# Patient Record
Sex: FEMALE | Race: BLACK OR AFRICAN AMERICAN | Employment: FULL TIME | URBAN - METROPOLITAN AREA
[De-identification: names, ages, dates, MRNs, and addresses within clinical notes are randomized per-mention and may not be internally consistent; named-entity substitution may affect disease eponyms.]

---

## 2021-12-13 NOTE — PROGRESS NOTES
Lakeisha Thibodeaux is a 64 y.o. female presenting today for New Patient (establish care) and Physical  .     Chief Complaint   Patient presents with    New Patient     establish care    Physical       HPI:  Lakeisha Thibodeaux presents to the office today to establish care. Patient used to see a PCP in [place] but has not had one since moving to [place] 5 yrs ago. HLD: Patient has a history of HLD with extensive family history of DM, HLD. She tried crestor, lipitor, and another statin but developed severe myalgias. She was switched to Zetia - with no side effects. She stopped taking it 2 yrs ago coz she felt like it wasn't doing anything. She has been exercising to stay healthy. 4 yrs ago she had intermittent chest pain while exercising - she underwent a stress test which was negative. She has not had a lipid panel checked in the past few years. Last LDL she remembers was 127. Patient states she has history of elevated LFTs. Screening for hepatitis was negative at that time. She had a liver biopsy which was negative 25 yrs ago. No cause was identified. Pt underwent a hysterectomy in 2017 after atypical cells were noted on the cervix. Healthcare Maintenance:  - Mammogram done on 12/09/21: BIRAD1/2. Screening again in 1 year  - Colonoscopy done a few years ago - polyp was removed. Was advised to repeat in 5 yrs. She has no active complaints at this time. ROS  ROS:  History obtained from the patient intake forms which are reviewed with the patient  · General: negative for - chills, fever, weight changes or malaise  · HEENT: no sore throat, nasal congestion, vision problems or ear problems  · Respiratory: no cough, shortness of breath, or wheezing  · Cardiovascular: no chest pain, palpitations, or dyspnea on exertion  · Gastrointestinal: no abdominal pain, N/V, change in bowel habits, or black or bloody stools  · Musculoskeletal: non specific intermittent back pain and knee pain.   · Neurological: no numbness, tingling, headache or dizziness  · Endo:  No polyuria or polydipsia. · : no hematuria, dysuria, frequency, hesitancy, or nocturia. · Psychological: negative for - anxiety, depression, sleep disturbances, suicidal or homicidal ideations      Allergies   Allergen Reactions    Crestor [Rosuvastatin] Myalgia    Lipitor [Atorvastatin] Myalgia       PHQ Screening   No flowsheet data found. History  History reviewed. No pertinent past medical history. Past Surgical History:   Procedure Laterality Date    HX HYSTERECTOMY      HX TUBAL LIGATION         Social History     Socioeconomic History    Marital status:      Spouse name: Not on file    Number of children: Not on file    Years of education: Not on file    Highest education level: Not on file   Occupational History    Not on file   Tobacco Use    Smoking status: Never Smoker    Smokeless tobacco: Never Used   Substance and Sexual Activity    Alcohol use: Yes    Drug use: Never    Sexual activity: Not Currently   Other Topics Concern    Not on file   Social History Narrative    Not on file     Social Determinants of Health     Financial Resource Strain:     Difficulty of Paying Living Expenses: Not on file   Food Insecurity:     Worried About Running Out of Food in the Last Year: Not on file    Joseph of Food in the Last Year: Not on file   Transportation Needs:     Lack of Transportation (Medical): Not on file    Lack of Transportation (Non-Medical):  Not on file   Physical Activity:     Days of Exercise per Week: Not on file    Minutes of Exercise per Session: Not on file   Stress:     Feeling of Stress : Not on file   Social Connections:     Frequency of Communication with Friends and Family: Not on file    Frequency of Social Gatherings with Friends and Family: Not on file    Attends Quaker Services: Not on file    Active Member of Clubs or Organizations: Not on file    Attends Club or Organization Meetings: Not on file    Marital Status: Not on file   Intimate Partner Violence:     Fear of Current or Ex-Partner: Not on file    Emotionally Abused: Not on file    Physically Abused: Not on file    Sexually Abused: Not on file   Housing Stability:     Unable to Pay for Housing in the Last Year: Not on file    Number of [place] in the Last Year: Not on file    Unstable Housing in the Last Year: Not on file             Vitals:    12/14/21 0803   BP: 138/78   Pulse: 86   Resp: 16   Temp: 97 °F (36.1 °C)   TempSrc: Oral   SpO2: 96%   Weight: 164 lb (74.4 kg)   Height: 5' 7\" (1.702 m)   PainSc:   0 - No pain       Physical Exam  Vitals and nursing note reviewed. Constitutional:       General: She is not in acute distress. Appearance: Normal appearance. She is normal weight. She is not ill-appearing, toxic-appearing or diaphoretic. HENT:      Head: Normocephalic and atraumatic. Nose: Nose normal. No congestion or rhinorrhea. Mouth/Throat:      Mouth: Mucous membranes are moist.      Pharynx: Oropharynx is clear. No oropharyngeal exudate or posterior oropharyngeal erythema. Eyes:      General: No scleral icterus. Right eye: No discharge. Left eye: No discharge. Extraocular Movements: Extraocular movements intact. Conjunctiva/sclera: Conjunctivae normal.      Pupils: Pupils are equal, round, and reactive to light. Cardiovascular:      Rate and Rhythm: Normal rate and regular rhythm. Pulses: Normal pulses. Heart sounds: Normal heart sounds. No murmur heard. No gallop. Pulmonary:      Effort: Pulmonary effort is normal. No respiratory distress. Breath sounds: Normal breath sounds. No wheezing or rales. Abdominal:      General: Bowel sounds are normal. There is no distension. Palpations: Abdomen is soft. There is no mass. Tenderness: There is no abdominal tenderness. There is no guarding or rebound. Hernia: No hernia is present. Musculoskeletal:         General: No swelling or tenderness. Normal range of motion. Cervical back: Normal range of motion. Right lower leg: No edema. Left lower leg: No edema. Skin:     General: Skin is warm and dry. Coloration: Skin is not jaundiced or pale. Neurological:      General: No focal deficit present. Mental Status: She is alert and oriented to person, place, and time. Mental status is at baseline. Cranial Nerves: No cranial nerve deficit. Motor: No weakness. Gait: Gait normal.   Psychiatric:         Mood and Affect: Mood normal.         Behavior: Behavior normal.         Thought Content: Thought content normal.         Judgment: Judgment normal.         No visits with results within 3 Month(s) from this visit. Latest known visit with results is:   No results found for any previous visit. No results found for any visits on 12/14/21. Patient Care Team:  Patient Care Team:  Brandy Loera MD as PCP - General (Internal Medicine)  Brandy Loera MD as PCP - [address] [place] [address] Provider      Assessment / Plan:      ICD-10-CM ICD-9-CM    1. Encounter for medical examination to establish care  Z00.00 V70.9    2. Hyperlipidemia, unspecified hyperlipidemia type  E78.5 272.4    3. Family history of diabetes mellitus (DM)  Z83.3 V18.0    4. Transaminitis  R74.01 790.4      HLD: Currently not on any medication since past few years. Check lipid panel and TSH/T4. Will discuss what medication to start based on results. Also screen for diabetes. Elevated LFTs: Check CMP. Healthcare Maintenance:  - Pap smear: not indicated due to hysterectomy  - Colonoscopy: requested patient to bring records. - Mammogram: Birad1/2. Due in 1 year  - Received COVID vaccine  - Received flu shot a month ago and Shingrix Vaccine: Requested to bring records on next visit. I asked the patient if she  had any questions and answered her  questions.   The patient stated that she understands the treatment plan and agrees with the treatment plan

## 2021-12-14 ENCOUNTER — OFFICE VISIT (OUTPATIENT)
Dept: FAMILY MEDICINE CLINIC | Age: 61
End: 2021-12-14
Payer: COMMERCIAL

## 2021-12-14 VITALS
HEART RATE: 86 BPM | BODY MASS INDEX: 25.74 KG/M2 | SYSTOLIC BLOOD PRESSURE: 138 MMHG | RESPIRATION RATE: 16 BRPM | TEMPERATURE: 97 F | DIASTOLIC BLOOD PRESSURE: 78 MMHG | OXYGEN SATURATION: 96 % | WEIGHT: 164 LBS | HEIGHT: 67 IN

## 2021-12-14 DIAGNOSIS — R74.01 TRANSAMINITIS: ICD-10-CM

## 2021-12-14 DIAGNOSIS — Z83.3 FAMILY HISTORY OF DIABETES MELLITUS (DM): ICD-10-CM

## 2021-12-14 DIAGNOSIS — Z00.00 ENCOUNTER FOR MEDICAL EXAMINATION TO ESTABLISH CARE: ICD-10-CM

## 2021-12-14 DIAGNOSIS — E78.5 HYPERLIPIDEMIA, UNSPECIFIED HYPERLIPIDEMIA TYPE: ICD-10-CM

## 2021-12-14 DIAGNOSIS — Z00.00 ENCOUNTER FOR MEDICAL EXAMINATION TO ESTABLISH CARE: Primary | ICD-10-CM

## 2021-12-14 PROCEDURE — 99386 PREV VISIT NEW AGE 40-64: CPT | Performed by: STUDENT IN AN ORGANIZED HEALTH CARE EDUCATION/TRAINING PROGRAM

## 2021-12-14 NOTE — PROGRESS NOTES
Harpreet Clay presents today for   Chief Complaint   Patient presents with    New Patient     establish care    Physical       Is someone accompanying this pt? no    Is the patient using any DME equipment during OV? no  Depression Screening:  No flowsheet data found. Learning Assessment:  Learning Assessment 12/14/2021   PRIMARY LEARNER Patient   HIGHEST LEVEL OF EDUCATION - PRIMARY LEARNER  > 4 YEARS OF COLLEGE   BARRIERS PRIMARY LEARNER NONE   PRIMARY LANGUAGE ENGLISH   LEARNER PREFERENCE PRIMARY READING   ANSWERED BY patient   RELATIONSHIP SELF       Abuse Screening:  No flowsheet data found. Fall Risk  No flowsheet data found. Health Maintenance reviewed and discussed and ordered per Provider. Health Maintenance Due   Topic Date Due    Hepatitis C Screening  Never done    DTaP/Tdap/Td series (1 - Tdap) Never done    Cervical cancer screen  Never done    Lipid Screen  Never done    Colorectal Cancer Screening Combo  Never done    Shingrix Vaccine Age 50> (1 of 2) Never done    Flu Vaccine (1) 09/01/2021   . Coordination of Care:  1. Have you been to the ER, urgent care clinic since your last visit? Hospitalized since your last visit? no    2. Have you seen or consulted any other health care providers outside of the [address] [place] [address] since your last visit? Include any pap smears or colon screening.  no

## 2022-01-09 LAB — SARS-COV-2, NAA: POSITIVE

## 2022-02-28 ENCOUNTER — HOSPITAL ENCOUNTER (OUTPATIENT)
Dept: LAB | Age: 62
Discharge: HOME | End: 2022-02-28

## 2022-02-28 LAB — SENTARA SPECIMEN COL,SENBCF: NORMAL

## 2022-02-28 PROCEDURE — 99001 SPECIMEN HANDLING PT-LAB: CPT

## 2022-03-01 ENCOUNTER — OFFICE VISIT (OUTPATIENT)
Dept: FAMILY MEDICINE CLINIC | Age: 62
End: 2022-03-01
Payer: COMMERCIAL

## 2022-03-01 ENCOUNTER — HOSPITAL ENCOUNTER (OUTPATIENT)
Dept: GENERAL RADIOLOGY | Age: 62
Discharge: HOME | End: 2022-03-01
Payer: COMMERCIAL

## 2022-03-01 VITALS
OXYGEN SATURATION: 97 % | SYSTOLIC BLOOD PRESSURE: 128 MMHG | WEIGHT: 170 LBS | HEART RATE: 88 BPM | BODY MASS INDEX: 26.68 KG/M2 | RESPIRATION RATE: 16 BRPM | HEIGHT: 67 IN | TEMPERATURE: 98 F | DIASTOLIC BLOOD PRESSURE: 82 MMHG

## 2022-03-01 DIAGNOSIS — M54.50 CHRONIC MIDLINE LOW BACK PAIN WITHOUT SCIATICA: ICD-10-CM

## 2022-03-01 DIAGNOSIS — E78.5 HYPERLIPIDEMIA, UNSPECIFIED HYPERLIPIDEMIA TYPE: ICD-10-CM

## 2022-03-01 DIAGNOSIS — G89.29 HIP PAIN, CHRONIC, RIGHT: ICD-10-CM

## 2022-03-01 DIAGNOSIS — R74.01 TRANSAMINITIS: ICD-10-CM

## 2022-03-01 DIAGNOSIS — M25.551 HIP PAIN, CHRONIC, RIGHT: Primary | ICD-10-CM

## 2022-03-01 DIAGNOSIS — M25.551 HIP PAIN, CHRONIC, RIGHT: ICD-10-CM

## 2022-03-01 DIAGNOSIS — G89.29 CHRONIC MIDLINE LOW BACK PAIN WITHOUT SCIATICA: ICD-10-CM

## 2022-03-01 DIAGNOSIS — G89.29 HIP PAIN, CHRONIC, RIGHT: Primary | ICD-10-CM

## 2022-03-01 LAB
A-G RATIO,AGRAT: 1.7 RATIO (ref 1.1–2.6)
ABSOLUTE LYMPHOCYTE COUNT, 10803: 1.1 K/UL (ref 1–4.8)
ALBUMIN SERPL-MCNC: 4.3 G/DL (ref 3.5–5)
ALP SERPL-CCNC: 100 U/L (ref 40–120)
ALT SERPL-CCNC: 20 U/L (ref 5–40)
ANION GAP SERPL CALC-SCNC: 13 MMOL/L (ref 3–15)
AST SERPL W P-5'-P-CCNC: 33 U/L (ref 10–37)
AVG GLU, 10930: 98 MG/DL (ref 91–123)
BASOPHILS # BLD: 0 K/UL (ref 0–0.2)
BASOPHILS NFR BLD: 1 % (ref 0–2)
BILIRUB SERPL-MCNC: 0.3 MG/DL (ref 0.2–1.2)
BUN SERPL-MCNC: 19 MG/DL (ref 6–22)
CALCIUM SERPL-MCNC: 9.6 MG/DL (ref 8.4–10.5)
CHLORIDE SERPL-SCNC: 102 MMOL/L (ref 98–110)
CHOLEST SERPL-MCNC: 302 MG/DL (ref 110–200)
CO2 SERPL-SCNC: 25 MMOL/L (ref 20–32)
CREAT SERPL-MCNC: 0.8 MG/DL (ref 0.8–1.4)
EOSINOPHIL # BLD: 0.1 K/UL (ref 0–0.5)
EOSINOPHIL NFR BLD: 5 % (ref 0–6)
ERYTHROCYTE [DISTWIDTH] IN BLOOD BY AUTOMATED COUNT: 13.2 % (ref 10–15.5)
GFRAA, 66117: >60
GFRNA, 66118: >60
GLOBULIN,GLOB: 2.5 G/DL (ref 2–4)
GLUCOSE SERPL-MCNC: 75 MG/DL (ref 70–99)
GRANULOCYTES,GRANS: 43 % (ref 40–75)
HBA1C MFR BLD HPLC: 5.1 % (ref 4.8–5.6)
HCT VFR BLD AUTO: 44.5 % (ref 35.1–48)
HDLC SERPL-MCNC: 3.3 MG/DL (ref 0–5)
HDLC SERPL-MCNC: 92 MG/DL
HGB BLD-MCNC: 14 G/DL (ref 11.7–16)
LDL/HDL RATIO,LDHD: 2.1
LDLC SERPL CALC-MCNC: 196 MG/DL (ref 50–99)
LYMPHOCYTES, LYMLT: 41 % (ref 20–45)
MCH RBC QN AUTO: 28 PG (ref 26–34)
MCHC RBC AUTO-ENTMCNC: 32 G/DL (ref 31–36)
MCV RBC AUTO: 90 FL (ref 80–99)
MONOCYTES # BLD: 0.3 K/UL (ref 0.1–1)
MONOCYTES NFR BLD: 10 % (ref 3–12)
NEUTROPHILS # BLD AUTO: 1.2 K/UL (ref 1.8–7.7)
NON-HDL CHOLESTEROL, 011976: 210 MG/DL
PLATELET # BLD AUTO: 207 K/UL (ref 140–440)
PMV BLD AUTO: 11 FL (ref 9–13)
POTASSIUM SERPL-SCNC: 4.1 MMOL/L (ref 3.5–5.5)
PROT SERPL-MCNC: 6.8 G/DL (ref 6.2–8.1)
RBC # BLD AUTO: 4.93 M/UL (ref 3.8–5.2)
SODIUM SERPL-SCNC: 140 MMOL/L (ref 133–145)
T4 FREE SERPL-MCNC: 1.3 NG/DL (ref 0.9–1.8)
TRIGL SERPL-MCNC: 69 MG/DL (ref 40–149)
TSH SERPL DL<=0.005 MIU/L-ACNC: 1.44 MCU/ML (ref 0.27–4.2)
VLDLC SERPL CALC-MCNC: 14 MG/DL (ref 8–30)
WBC # BLD AUTO: 2.7 K/UL (ref 4–11)

## 2022-03-01 PROCEDURE — 73502 X-RAY EXAM HIP UNI 2-3 VIEWS: CPT

## 2022-03-01 PROCEDURE — 72100 X-RAY EXAM L-S SPINE 2/3 VWS: CPT

## 2022-03-01 PROCEDURE — 99213 OFFICE O/P EST LOW 20 MIN: CPT | Performed by: STUDENT IN AN ORGANIZED HEALTH CARE EDUCATION/TRAINING PROGRAM

## 2022-03-01 RX ORDER — CYCLOSPORINE 0.5 MG/ML
EMULSION OPHTHALMIC
COMMUNITY
Start: 2022-02-15

## 2022-03-01 NOTE — PROGRESS NOTES
Nigel Moon is a 64 y.o. female presenting today for Results (review of lab results )  . Chief Complaint   Patient presents with    Results     review of lab results        HPI:  Nigel Moon presents to the office today for follow up. Patient used to see a PCP in [place] but has not had one since moving to [place] 5 yrs ago. HLD: Patient has a history of HLD with extensive family history of DM, HLD. She tried crestor, lipitor, and another statin but developed severe myalgias. She was switched to Zetia - with no side effects. She stopped taking it 2 yrs ago coz she felt like it wasn't doing anything. She has been exercising to stay healthy. 4 yrs ago she had intermittent chest pain while exercising - she underwent a stress test which was negative. She has not had a lipid panel checked in the past few years. Last LDL she remembers was 127. Patient states she has history of elevated LFTs. Screening for hepatitis was negative at that time. She had a liver biopsy which was negative 25 yrs ago. No cause was identified. Pt underwent a hysterectomy in 2017 after atypical cells were noted on the cervix. Today, Patient is complaining of lower back pain and Rt hip pain. She used to go to a chiropracter in the past and was told she had OA. She has been taking ibuprofen intermittently which relieves the pain. She was prescribed mobic in the past bust stopped taking it due to leg swelling. Healthcare Maintenance:  - Mammogram done on 12/09/21: BIRAD1/2. Screening again in 1 year  - Colonoscopy done a few years ago - polyp was removed. Was advised to repeat in 5 yrs.          ROS  ROS:  History obtained from the patient intake forms which are reviewed with the patient  · General: negative for - chills, fever, weight changes or malaise  · HEENT: no sore throat, nasal congestion, vision problems or ear problems  · Respiratory: no cough, shortness of breath, or wheezing  · Cardiovascular: no chest pain, palpitations, or dyspnea on exertion  · Gastrointestinal: no abdominal pain, N/V, change in bowel habits, or black or bloody stools  · Musculoskeletal: non specific intermittent back pain and knee pain. · Neurological: no numbness, tingling, headache or dizziness  · Endo:  No polyuria or polydipsia. · : no hematuria, dysuria, frequency, hesitancy, or nocturia. · Psychological: negative for - anxiety, depression, sleep disturbances, suicidal or homicidal ideations      Allergies   Allergen Reactions    Crestor [Rosuvastatin] Myalgia    Lipitor [Atorvastatin] Myalgia       PHQ Screening   3 most recent PHQ Screens 3/1/2022   Little interest or pleasure in doing things Not at all   Feeling down, depressed, irritable, or hopeless Not at all   Total Score PHQ 2 0       History  Past Medical History:   Diagnosis Date    Elevated LFTs     HLD (hyperlipidemia)        Past Surgical History:   Procedure Laterality Date    HX HYSTERECTOMY      HX TUBAL LIGATION         Social History     Socioeconomic History    Marital status:      Spouse name: Not on file    Number of children: Not on file    Years of education: Not on file    Highest education level: Not on file   Occupational History    Not on file   Tobacco Use    Smoking status: Never Smoker    Smokeless tobacco: Never Used   Vaping Use    Vaping Use: Never used   Substance and Sexual Activity    Alcohol use: Yes    Drug use: Never    Sexual activity: Not Currently   Other Topics Concern    Not on file   Social History Narrative    Not on file     Social Determinants of Health     Financial Resource Strain:     Difficulty of Paying Living Expenses: Not on file   Food Insecurity:     Worried About Running Out of Food in the Last Year: Not on file    Joseph of Food in the Last Year: Not on file   Transportation Needs:     Lack of Transportation (Medical): Not on file    Lack of Transportation (Non-Medical):  Not on file   Physical Activity:     Days of Exercise per Week: Not on file    Minutes of Exercise per Session: Not on file   Stress:     Feeling of Stress : Not on file   Social Connections:     Frequency of Communication with Friends and Family: Not on file    Frequency of Social Gatherings with Friends and Family: Not on file    Attends Latter day Services: Not on file    Active Member of Clubs or Organizations: Not on file    Attends Club or Organization Meetings: Not on file    Marital Status: Not on file   Intimate Partner Violence:     Fear of Current or Ex-Partner: Not on file    Emotionally Abused: Not on file    Physically Abused: Not on file    Sexually Abused: Not on file   Housing Stability:     Unable to Pay for Housing in the Last Year: Not on file    Number of [place] in the Last Year: Not on file    Unstable Housing in the Last Year: Not on file             Vitals:    03/01/22 0807   BP: 128/82   Pulse: 88   Resp: 16   Temp: 98 °F (36.7 °C)   TempSrc: Temporal   SpO2: 97%   Weight: 170 lb (77.1 kg)   Height: 5' 7\" (1.702 m)   PainSc:   0 - No pain       Physical Exam  Vitals and nursing note reviewed. Constitutional:       General: She is not in acute distress. Appearance: Normal appearance. She is normal weight. She is not ill-appearing, toxic-appearing or diaphoretic. HENT:      Head: Normocephalic and atraumatic. Nose: Nose normal. No congestion or rhinorrhea. Mouth/Throat:      Mouth: Mucous membranes are moist.      Pharynx: Oropharynx is clear. No oropharyngeal exudate or posterior oropharyngeal erythema. Eyes:      General: No scleral icterus. Right eye: No discharge. Left eye: No discharge. Extraocular Movements: Extraocular movements intact. Conjunctiva/sclera: Conjunctivae normal.      Pupils: Pupils are equal, round, and reactive to light. Cardiovascular:      Rate and Rhythm: Normal rate and regular rhythm. Pulses: Normal pulses. Heart sounds: Normal heart sounds. No murmur heard. No gallop. Pulmonary:      Effort: Pulmonary effort is normal. No respiratory distress. Breath sounds: Normal breath sounds. No wheezing or rales. Abdominal:      General: Bowel sounds are normal. There is no distension. Palpations: Abdomen is soft. There is no mass. Tenderness: There is no abdominal tenderness. There is no guarding or rebound. Hernia: No hernia is present. Musculoskeletal:         General: No swelling or tenderness. Normal range of motion. Cervical back: Normal range of motion. Right lower leg: No edema. Left lower leg: No edema. Skin:     General: Skin is warm and dry. Coloration: Skin is not jaundiced or pale. Neurological:      General: No focal deficit present. Mental Status: She is alert and oriented to person, place, and time. Mental status is at baseline. Cranial Nerves: No cranial nerve deficit. Motor: No weakness. Gait: Gait normal.   Psychiatric:         Mood and Affect: Mood normal.         Behavior: Behavior normal.         Thought Content: Thought content normal.         Judgment: Judgment normal.         Hospital Outpatient Visit on 02/28/2022   Component Date Value Ref Range Status    [facility] 02/28/2022 Specimens collected/sent to [facility]    Final       Results for orders placed or performed during the hospital encounter of 03/01/22   XR HIP RT W OR WO PELV 2-3 VWS    Narrative    Lumbar spine and right hip radiographs    HISTORY: Right lower back and right hip pain. FINDINGS: Frontal and lateral imaging of the lumbar spine. Frontal pelvis and  frog-leg lateral images of the right hip. A total of 5 images. The lumbar spine demonstrates moderately severe degenerative disc changes at  L3-L4 with left lateral osteophyte and at L4-L5 with right lateral osteophyte.   There are also degenerative changes at L5-S1 with prominent rightward anterior  osteophyte. Vertebral body heights are generally maintained. There is some  straightening of the lumbar lordosis. SI joints grossly intact. There is no acute fracture of the right hip. No significant arthritic change. Femoral head normal contour. No hip dislocation. Minimal osteitis pubis. Impression    Moderately severe degenerative disc disease L3-L4, L4-L5 and L5-S1. No significant arthritic change of the right hip. Osteitis pubis. No acute fractures. XR SPINE LUMB 2 OR 3 V    Narrative    Lumbar spine and right hip radiographs    HISTORY: Right lower back and right hip pain. FINDINGS: Frontal and lateral imaging of the lumbar spine. Frontal pelvis and  frog-leg lateral images of the right hip. A total of 5 images. The lumbar spine demonstrates moderately severe degenerative disc changes at  L3-L4 with left lateral osteophyte and at L4-L5 with right lateral osteophyte. There are also degenerative changes at L5-S1 with prominent rightward anterior  osteophyte. Vertebral body heights are generally maintained. There is some  straightening of the lumbar lordosis. SI joints grossly intact. There is no acute fracture of the right hip. No significant arthritic change. Femoral head normal contour. No hip dislocation. Minimal osteitis pubis. Impression    Moderately severe degenerative disc disease L3-L4, L4-L5 and L5-S1. No significant arthritic change of the right hip. Osteitis pubis. No acute fractures. Patient Care Team:  Patient Care Team:  Sherley Weiss MD as PCP - General (Internal Medicine)  Sherley Weiss MD as PCP - [facility] Provider      Assessment / Plan:      ICD-10-CM ICD-9-CM    1. Hip pain, chronic, right  M25.551 719.45 XR HIP RT W OR WO PELV 2-3 VWS    G89.29 338.29    2. Chronic midline low back pain without sciatica  M54.50 724.2 XR SPINE LUMB 2 OR 3 V    G89.29 338.29    3.  Hyperlipidemia, unspecified hyperlipidemia type  E78.5 272.4    4. Transaminitis  R74.01 790.4         Patient got labs done yesterday - results are pending. HLD: Currently not on any medication since past few years. Labs pending. Will discuss what medication to start based on results. Also screen for diabetes. Elevated LFTs: Check CMP. Hip pain and back pain: Likely due to OA. Xrays ordered. Plan to discuss further management once I review the lab results. Healthcare Maintenance:  - Pap smear: not indicated due to hysterectomy  - Colonoscopy: Patient completed records release form. - Mammogram: Birad1/2. Due in 1 year - 12/22  - Received COVID vaccine          I asked the patient if she  had any questions and answered her  questions.   The patient stated that she understands the treatment plan and agrees with the treatment plan

## 2022-03-01 NOTE — PROGRESS NOTES
Chief Complaint   Patient presents with    Results     review of lab results      1. \"Have you been to the ER, urgent care clinic since your last visit? Hospitalized since your last visit? \" Yes [facility] for negative COVID 19     2. \"Have you seen or consulted any other health care providers outside of the [address] [place] [address] since your last visit? \" No     3. For patients aged 39-70: Has the patient had a colonoscopy / FIT/ Cologuard? Yes - Care Gap present. Rooming MA/LPN to request most recent results  [place], [place]     If the patient is female:    4. For patients aged 41-77: Has the patient had a mammogram within the past 2 years? Yes - Care Gap present. Rooming MA/LPN to request most recent results      5. For patients aged 21-65: Has the patient had a pap smear?  No history of hysterectomy     Health Maintenance Due   Topic Date Due    Hepatitis C Screening  Never done    Depression Screen  Never done    Lipid Screen  Never done    Colorectal Cancer Screening Combo  Never done    Shingrix Vaccine Age 50> (1 of 2) Never done     Medical record release sign for [facility]

## 2022-03-02 ENCOUNTER — TELEPHONE (OUTPATIENT)
Dept: FAMILY MEDICINE CLINIC | Age: 62
End: 2022-03-02

## 2022-03-02 DIAGNOSIS — E78.5 HYPERLIPIDEMIA, UNSPECIFIED HYPERLIPIDEMIA TYPE: ICD-10-CM

## 2022-03-02 DIAGNOSIS — G89.29 CHRONIC MIDLINE LOW BACK PAIN WITHOUT SCIATICA: Primary | ICD-10-CM

## 2022-03-02 DIAGNOSIS — M54.50 CHRONIC MIDLINE LOW BACK PAIN WITHOUT SCIATICA: Primary | ICD-10-CM

## 2022-03-03 RX ORDER — PRAVASTATIN SODIUM 10 MG/1
10 TABLET ORAL
Qty: 30 TABLET | Refills: 1 | Status: SHIPPED | OUTPATIENT
Start: 2022-03-03 | End: 2022-04-04

## 2022-03-03 NOTE — TELEPHONE ENCOUNTER
Discussed results with patient over the phone. She has elevated LDL: 196. Has tried Zocor, Lipitor and Crestor in the past - she had to stop due to myalgias. Will try low dose pravastatin and see if she can tolerate - discussed with patient in detail. She does have a family history of heart disease and CVA. Patient also wants to get heart testing due to her family history. Last stress test was 3 years ago and was normal.     Back pain: Xray lumbar showed DJD.  Refer to PT.

## 2022-03-15 ENCOUNTER — HOSPITAL ENCOUNTER (OUTPATIENT)
Dept: PHYSICAL THERAPY | Age: 62
Discharge: HOME | End: 2022-03-15
Attending: STUDENT IN AN ORGANIZED HEALTH CARE EDUCATION/TRAINING PROGRAM
Payer: COMMERCIAL

## 2022-03-15 PROCEDURE — 97161 PT EVAL LOW COMPLEX 20 MIN: CPT

## 2022-03-15 NOTE — PROGRESS NOTES
PT DAILY TREATMENT NOTE/LUMBAR EVAL     Patient Name: Addie Cushing  Date:3/15/2022  : 1960  [x]  Patient  Verified  Payor: [employer] / Plan: [employer] / Product Type: HMO /    In time:1040  Out time:1120  Total Treatment Time (min): 40  Visit #: 1 of 8   Treatment Area: Other low back pain [M54.59]  SUBJECTIVE  Pain Level (0-10 scale): 4  [x]constant []intermittent []improving []worsening [x]no change since onset  Worse running, bending, exertion, yard work , job demands - prolonged sitting and standing, ergonomics. Better standing/sitting changing position  Any medication changes, allergies to medications, adverse drug reactions, diagnosis change, or new procedure performed?: [x] No    [] Yes (see summary sheet for update)  Subjective functional status/changes:     PLOF: longstanding Hx of LBP with baseline pain levels, describes recurrent episodes of increase / decrease pain, but still able to tolerate ADLS and activities, no AD, working, drives, Yoga, active lifestyle  Limitations to PLOF: pain   Mechanism of Injury: longstanding involvement that progressively has gotten worse most recently went to [facility] in 2022 to establish care as she moved here from [facility]. Current symptoms/Complaints: 65 YO female diagnosed as above and with S/S consistent with above diagnosis presents to skilled outpatient PT [facility] LBP, right > left indicating right hip region as well.  Pain today is 4/10,   Previous Treatment/Compliance: [facility], x-rays, medication, injection approx 10 years ago,   PMHx/Surgical Hx: weight change up recently  Work Hx: full time, [employer], admin work  Living Situation:  Lives in a 2 story home, not alone, 4 [address] with rail  Pt Goals: ways to help reduce discomfort  Barriers: [x]pain []financial []time []transportation []other  Motivation: Good  Substance use: []Alcohol []Tobacco []other:   FABQ Score: []low []elevate  Cognition: A & O x 3    Other:    OBJECTIVE/EXAMINATION  Domestic Life: work, yoga, household and community activities, yard work , enjoys walking  Activity/Recreational Limitations: pain   Mobility: I  Self Care: I       40 min [x]Eval                  []Re-Eval              With   [] TE   [] TA   [] neuro   [] other: Patient Education: [x] Review HEP    [] Progressed/Changed HEP based on:   [] positioning   [] body mechanics   [] transfers   [] heat/ice application    [] other:      Other Objective/Functional Measures:      Physical Therapy Evaluation - Lumbar Spine ([facility])    SUBJECTIVE  Chief Complaint:as above    Mechanism of injury:as above    Symptoms:as above  Aggravated by:   [] Bending [] Sitting [] Standing [] Walking   [] Moving [] Cough [] Sneeze [] Valsalva   [] AM  [] PM  Lying:  [] sup   [] pro   [] sidelying   [] Other:     Eased by:    [] Bending [] Sitting [] Standing [] Walking   [] Moving [] AM  [] PM  Lying: [] sup  [] pro  [] sidelying   [] Other:      Past History/Treatments: as above    Diagnostic Tests: [] Lab work [x] X-rays    [] CT [] MRI     [] Other:  Results:    Functional Status  Prior level of function: as above  Present functional limitations: FOTO   What position do you sleep in?: varies    OBJECTIVE  Posture: mild FH, RS,   Lateral Shift: [] R    [] L     [] +  [x] -  Kyphosis: [] Increased [] Decreased   []  WNL  Lordosis:  [] Increased [] Decreased   [x] WNL  Pelvic symmetry: [] WNL    [] Other:  Stork test normal B     Gait:  [x] Normal     [] Abnormal:    Active Movements: [] N/A   [] Too acute   [] Other:  ROM  AROM % PROM Comments:pain, area   Forward flexion 40-60 28 cm     Extension 20-30 27 degrees     SB right 20-30      SB left 20-30      Rotation right 5-10 %     Rotation left 5-10 %       Repeated Movements   Effects on present pain: produces (OH), abolishes (A), increases (incr), decreases (decr), centralizes (C), peripheral (PH), no effect (NE)   Pre-Test Sx Flexion Repeated Flexion Extension Repeated Extension Repeated SBL Repeated SBR   Sitting          Standing          Lying      N/A N/A   Comments:  Side Glide:  Sustained passive positioning test:    Neuro Screen [] WNL  Myotome/Dermatome/Reflexes:  Comments:    Dural Mobility:  SLR Sitting: [] R    [] L    [] +    [] -  @ (degrees):           Supine: [] R    [] L    [] +    [] -  @ (degrees):   Slump Test: [] R    [] L    [] +    [] -  @ (degrees):   Prone Knee Bend: [] R    [] L    [] +    [] -     Palpation  [] Min  [x] Mod  [] Severe    Location:STC TTP right superior glut, right SIJ and right piriformis region  [] Min  [] Mod  [] Severe    Location:  [] Min  [] Mod  [] Severe    Location:    Strength   L(0-5) R (0-5) N/T   Hip Flexion (L1,2) 5 4 []   Knee Extension (L3,4) 5 5 []   Ankle Dorsiflexion (L4) 5 5 []   Great Toe Extension (L5)   []   Ankle Plantarflexion (S1)   []   Knee Flexion (S1,2)   []   Upper Abdominals   []   Lower Abdominals   []   Paraspinals   []   Back Rotators   []   Gluteus Volodymyr   []   abd/add 5/5 5/5 []     Special Tests  Lumbar:  Lumb. Compression: [] Pos  [] Neg               Lumbar Distraction:   [] Pos  [] Neg    Quadrant:  [] Pos  [] Neg   [] Flex  [] Ext    Sacroilliac:  Gaenslen's: [] R    [] L    [] +    [] -     Compression: [] +    [] -     Gapping:  [] +    [] -     Thigh Thrust: [] R    [] L    [] +    [] -     Leg Length: [] +    [] -   Position:    Crests:    ASIS:    PSIS:    Sacral Sulcus:    Mobility: Standing flex:     Sitting flex:     Supine to sit:     Prone knee bend:         Hip: Merla Waldwick:  [] R    [] L    [] +    [] -     Scour:  [] R    [] L    [] +    [] -     Piriformis: [x] R  >  [x] L    [x] +    [] -          Deficits: Aaron's: [] R    [] L    [] +    [] -     Calvin: [] R    [] L    [] +    [] -     Hamstrings 90/90:    Gastrocsoleus (to neutral): Right: Left:       Global Muscular Weakness:  Abdominals:  Quadratus Lumborum:  Paraspinals:   Other:    Other tests/comments:       Pain Level (0-10 scale) post treatment: 4    ASSESSMENT/Changes in Function: Patient demonstrates the potential to make gains with improved ROM, strength, endurance/activity tolerance, functional FOTO survey score   and all within a reasonable time frame so as to increase their functional independence with ADLs and activities for carryover to  Improved quality, tolerance to household activities and work demands, ability to enjoy walking dog with no increase pain and participate with Yoga. Patient requires skilled Physical Therapy so as to monitor their response to and modify their treatment plan accordingly. Patient appears to be an appropriate candidate for skilled outpatient Physical Therapy. Patient will continue to benefit from skilled PT services to modify and progress therapeutic interventions, address ROM deficits, address strength deficits, analyze and address soft tissue restrictions, analyze and cue movement patterns, analyze and modify body mechanics/ergonomics, assess and modify postural abnormalities and instruct in home and community integration to attain remaining goals.      [x]  See Plan of Care  []  See progress note/recertification  []  See Discharge Summary         Progress towards goals / Updated goals:       PLAN  [x]  Upgrade activities as tolerated     [x]  Continue plan of care  []  Update interventions per flow sheet       []  Discharge due to:_  []  Other:_      Tiara Seymour, PT 3/15/2022  10:41 AM

## 2022-03-15 NOTE — PROGRESS NOTES
In [address] [place] [address], [address] [place] [address], [address]  [phone] fax      Plan of Care/ Statement of Necessity for Physical Therapy Services    Patient name: Virginie Monsalve Start of Care: 3/15/2022   Referral source: Isaura Webster MD : 1960    Medical Diagnosis: Other low back pain [M54.59]  Payor: Andre Chester / Plan: [employer] HMO / Product Type: HMO /  Onset Date:longstanding,  2022    Treatment Diagnosis: lower back pain   Prior Hospitalization: see medical history Provider#: [id number]   Medications: Verified on Patient summary List    Comorbidities: weight change recently   Prior Level of Function: longstanding Hx of LBP with baseline pain levels, describes recurrent episodes of increase / decrease pain, but still able to tolerate ADLS and activities, no AD, working, drives, Yoga, active lifestyle       The Plan of Care and following information is based on the information from the initial evaluation. Assessment/ key information:  63 YO female diagnosed as above and with S/S consistent with above diagnosis presents to skilled outpatient PT [facility] LBP, right > left indicating right hip region as well. Pain today is 4/10, she has decrease trunk ROM, decrease core  Strength, + right piriformis tension. Additionally she has B knee pain. Patient demonstrates the potential to make gains with improved ROM, strength, endurance/activity tolerance, functional FOTO survey score   and all within a reasonable time frame so as to increase their functional independence with ADLs and activities for carryover to  Improved quality, tolerance to household activities and work demands, ability to enjoy walking dog with no increase pain and participate with Yoga. Patient requires skilled Physical Therapy so as to monitor their response to and modify their treatment plan accordingly.  Patient appears to be an appropriate candidate for skilled outpatient Physical Therapy.         Evaluation Complexity History MEDIUM  Complexity : 1-2 comorbidities / personal factors will impact the outcome/ POC ; Examination MEDIUM Complexity : 3 Standardized tests and measures addressing body structure, function, activity limitation and / or participation in recreation  ;Presentation LOW Complexity : Stable, uncomplicated  ;Clinical Decision Making MEDIUM Complexity : FOTO score of 26-74  Overall Complexity Rating: LOW   Problem List: pain affecting function, decrease ROM, decrease strength, decrease ADL/ functional abilitiies, decrease activity tolerance, decrease flexibility/ joint mobility and other FOTO 61   Treatment Plan may include any combination of the following: Therapeutic exercise, Therapeutic activities, Neuromuscular re-education, Physical agent/modality, Manual therapy, Patient education, Self Care training and Home safety training  Patient / Family readiness to learn indicated by: asking questions, trying to perform skills and interest  Persons(s) to be included in education: patient (P)  Barriers to Learning/Limitations: None  Patient Goal (s): ways to help reduce discomfort  Patient Self Reported Health Status: good  Rehabilitation Potential: good    Short Term Goals: To be accomplished in 4 treatments:   1 patient will have established and be I with HEP to aid with I and self management at discharge   EVAL issued HEP   CURRENT   2 patient will have pain 3/10 to aid with increase tolerance to participating in recreation-  yoga   EVAL 4 , limited a little   CURRENT  Long Term Goals:  To be accomplished in 8 treatments:   1 patient will have pain 1/10 to aid with increase tolerance to participating in recreation-  yoga   EVAL 4 , limited a little   CURRENT   2 patient will have FOTO 69 to show projected increased gains in function and tolerance to ADLS and activities at home and work   EVAL 61   CURRENT   3 patient will have tolerance to stoop and lift 20# 5X for carryover to lifting heavy items   EVAL limited a lot   CURRENT   4 patient will report overall 50% improvement to aid with increase tolerance to yoga and work demands   EVAL pain worse with bending and yard work   CURRENT    Frequency / Duration: Patient to be seen 2 times per week for 4 weeks. Patient/ Caregiver education and instruction: Diagnosis, prognosis, self care, activity modification and exercises   [x]  Plan of care has been reviewed with JESUS Zapata, PT 3/15/2022 11:06 AM  ________________________________________________________________________    I certify that the above Therapy Services are being furnished while the patient is under my care. I agree with the treatment plan and certify that this therapy is necessary.     [de-identified] Signature:____________Date:_________TIME:________     Elise Randall MD  ** Signature, Date and Time must be completed for valid certification **      Please sign and return to In [address] [place] [address] Square  [address] [place] [address], [address] [place] [address], [address] [facility] [address] [phone] fax

## 2022-03-18 PROBLEM — G89.29 CHRONIC MIDLINE LOW BACK PAIN WITHOUT SCIATICA: Status: ACTIVE | Noted: 2022-03-01

## 2022-03-18 PROBLEM — M54.50 CHRONIC MIDLINE LOW BACK PAIN WITHOUT SCIATICA: Status: ACTIVE | Noted: 2022-03-01

## 2022-03-19 PROBLEM — E78.5 HYPERLIPIDEMIA: Status: ACTIVE | Noted: 2022-03-01

## 2022-03-19 PROBLEM — R74.01 TRANSAMINITIS: Status: ACTIVE | Noted: 2022-03-01

## 2022-03-20 PROBLEM — M25.551 HIP PAIN, CHRONIC, RIGHT: Status: ACTIVE | Noted: 2022-03-01

## 2022-03-20 PROBLEM — G89.29 HIP PAIN, CHRONIC, RIGHT: Status: ACTIVE | Noted: 2022-03-01

## 2022-03-21 ENCOUNTER — HOSPITAL ENCOUNTER (OUTPATIENT)
Dept: PHYSICAL THERAPY | Age: 62
Discharge: HOME | End: 2022-03-21
Attending: STUDENT IN AN ORGANIZED HEALTH CARE EDUCATION/TRAINING PROGRAM
Payer: COMMERCIAL

## 2022-03-21 PROCEDURE — 97140 MANUAL THERAPY 1/> REGIONS: CPT

## 2022-03-21 PROCEDURE — 97112 NEUROMUSCULAR REEDUCATION: CPT

## 2022-03-21 PROCEDURE — 97110 THERAPEUTIC EXERCISES: CPT

## 2022-03-21 NOTE — PROGRESS NOTES
PT DAILY TREATMENT NOTE     Patient Name: Nigel Moon  Date:3/21/2022  : 1960  [x]  Patient  Verified  Payor: Chen Gonzalez / Plan: [employer] HMO / Product Type: HMO /    In time: 11:16A  Out time:12:11P  Total Treatment Time (min): 55  Visit #: 2 of 8    Treatment Area: Other low back pain [M54.59]    SUBJECTIVE  Pain Level (0-10 scale): 4  Any medication changes, allergies to medications, adverse drug reactions, diagnosis change, or new procedure performed?: [x] No    [] Yes (see summary sheet for update)  Subjective functional status/changes:   [] No changes reported  Pt states \"oh just the usual\" in regards to low back pain level experiencing upon arrival to today's session. States not able to complete full HEP yet, but when she has performed some of the exercises \"it felt good\".     OBJECTIVE    Modality rationale: decrease pain and increase tissue extensibility to improve the patients ability to perform ADL/IADLs with greater ease    Min Type Additional Details    [] Estim:  []Unatt       []IFC  []Premod                        []Other:  []w/ice   []w/heat  Position:  Location:    [] Estim: []Att    []TENS instruct  []NMES                    []Other:  []w/US   []w/ice   []w/heat  Position:  Location:    []  Traction: [] Cervical       []Lumbar                       [] Prone          []Supine                       []Intermittent   []Continuous Lbs:  [] before manual  [] after manual    []  Ultrasound: []Continuous   [] Pulsed                           []1MHz   []3MHz W/cm2:  Location:    []  Iontophoresis with dexamethasone         Location: [] Take home patch   [] In clinic   10 []  Ice     [x]  heat  []  Ice massage  []  Laser   []  Anodyne Position: prone w/ bolster under B ankle  Location: low back/B Hip     []  Laser with stim  []  Other:  Position:  Location:    []  Vasopneumatic Device    []  Right     []  Left  Pre-treatment girth:  Post-treatment girth:  Measured at (location):  Pressure: [] lo [] med [] hi   Temperature: [] lo [] med [] hi     24 min Therapeutic Exercise:  [x] See flow sheet :   Rationale: increase ROM and increase strength to improve the patients ability to perform ADLs with greater ease     11 min Neuromuscular Re-education:  [x]  See flow sheet :   Rationale: increase strength, improve coordination and increase proprioception  to improve the patients ability to complete recreational activities with greater ease     10 min Manual Therapy:  (prone): STM/TPr to right lumbar paraspinal, QL and glute med, manual stretching of hip IR/ER    The manual therapy interventions were performed at a separate and distinct time from the therapeutic activities interventions. Rationale: decrease pain, increase ROM, increase tissue extensibility and decrease trigger points to increase tolerance to therex and return to PLOF           With   [x] TE   [] TA   [x] neuro   [] other: Patient Education: [x] Review HEP    [] Progressed/Changed HEP based on:   [] positioning   [] body mechanics   [] transfers   [] heat/ice application    [] other:      Other Objective/Functional Measures: Introduced ex program per flow sheet      Pain Level (0-10 scale) post treatment: 2    ASSESSMENT/Changes in Function: Initiated session w/ MT techniques listed above followed by performance of ex program per POC - occaitional cuing required to optimize glute activation as pt demonstrates significant lumbar paraspinal compensations during performance. Improved performance noted upon removal of cuing. Pt with positive response to session reporting a reduction in pain levels. Will continue to progress activity program as able and tolerated.       Patient will continue to benefit from skilled PT services to modify and progress therapeutic interventions, address functional mobility deficits, address ROM deficits, address strength deficits, analyze and address soft tissue restrictions, analyze and cue movement patterns, analyze and modify body mechanics/ergonomics, assess and modify postural abnormalities and instruct in home and community integration to attain remaining goals. [x]  See Plan of Care  []  See progress note/recertification  []  See Discharge Summary         Progress towards goals / Updated goals:  Short Term Goals: To be accomplished in 4 treatments:               1 patient will have established and be I with HEP to aid with I and self management at discharge               EVAL issued HEP               CURRENT:Progressing: Initiated, however not ind at this time, 03/21/22               2 patient will have pain 3/10 to aid with increase tolerance to participating in recreation-  yoga               EVAL 4 , limited a little               CURRENT  Long Term Goals:  To be accomplished in 8 treatments:               1 patient will have pain 1/10 to aid with increase tolerance to participating in recreation-  yoga               EVAL 4 , limited a little               CURRENT               2 patient will have FOTO 69 to show projected increased gains in function and tolerance to ADLS and activities at home and work               EVAL 61               CURRENT               3 patient will have tolerance to stoop and lift 20# 5X for carryover to lifting heavy items               EVAL limited a lot               CURRENT               4 patient will report overall 50% improvement to aid with increase tolerance to yoga and work demands               EVAL pain worse with bending and yard work               CURRENT      PLAN  [x]  Upgrade activities as tolerated     [x]  Continue plan of care  [x]  Update interventions per flow sheet       []  Discharge due to:_  []  Other:_      Charlie Graves DPT 3/21/2022  11:25 AM    Future Appointments   Date Time Provider John Ravi   3/25/2022 10:30 AM Sara Olguin PTA [facility]   3/28/2022 11:15 AM Kathia Garcia PTA [facility]   4/1/2022 10:30 AM Kathia Garcia PTA [facility]   4/4/2022 10:30 AM Romeo Ormond, PTA [facility]   4/8/2022 10:30 AM Romeo Ormond, PTA [facility]   4/11/2022 10:30 AM Radhames Kathleen, PT [facility]   7/12/2022  8:00 AM Gunnar Lau MD [facility]

## 2022-03-25 ENCOUNTER — HOSPITAL ENCOUNTER (OUTPATIENT)
Dept: PHYSICAL THERAPY | Age: 62
Discharge: HOME | End: 2022-03-25
Attending: STUDENT IN AN ORGANIZED HEALTH CARE EDUCATION/TRAINING PROGRAM
Payer: COMMERCIAL

## 2022-03-25 PROCEDURE — 97530 THERAPEUTIC ACTIVITIES: CPT | Performed by: PHYSICAL THERAPIST

## 2022-03-25 PROCEDURE — 97112 NEUROMUSCULAR REEDUCATION: CPT | Performed by: PHYSICAL THERAPIST

## 2022-03-25 PROCEDURE — 97140 MANUAL THERAPY 1/> REGIONS: CPT | Performed by: PHYSICAL THERAPIST

## 2022-03-25 PROCEDURE — 97110 THERAPEUTIC EXERCISES: CPT | Performed by: PHYSICAL THERAPIST

## 2022-03-25 NOTE — PROGRESS NOTES
PT DAILY TREATMENT NOTE     Patient Name: Beti Damian  Date:3/25/2022  : 1960  [x]  Patient  Verified  Payor: Sally Whatley / Plan: [facility] [employer] [place] [employer] / Product Type: HMO /    In time:11:30A  Out time:1230  Total Treatment Time (min): 60min  Visit #: 3 of 8    Treatment Area: Other low back pain [M54.59]    SUBJECTIVE  Pain Level (0-10 scale): 4/10  Any medication changes, allergies to medications, adverse drug reactions, diagnosis change, or new procedure performed?: [x] No    [] Yes (see summary sheet for update)  Subjective functional status/changes:   [] No changes reported  Patient got her appointment times confused - but is able to now get up and down with a little more ease    OBJECTIVE    Modality rationale: decrease edema, decrease pain and increase tissue extensibility to improve the patients ability to increase tolerance to activity.     Min Type Additional Details    [] Estim:  []Unatt       []IFC  []Premod                        []Other:  []w/ice   []w/heat  Position:  Location:    [] Estim: []Att    []TENS instruct  []NMES                    []Other:  []w/US   []w/ice   []w/heat  Position:  Location:    []  Traction: [] Cervical       []Lumbar                       [] Prone          []Supine                       []Intermittent   []Continuous Lbs:  [] before manual  [] after manual    []  Ultrasound: []Continuous   [] Pulsed                           []1MHz   []3MHz W/cm2:  Location:    []  Iontophoresis with dexamethasone         Location: [] Take home patch   [] In clinic   15 []  Ice     [x]  heat  []  Ice massage  []  Laser   []  Anodyne Position: prone  Location: lumbar spine    []  Laser with stim  []  Other:  Position:  Location:    []  Vasopneumatic Device    []  Right     []  Left  Pre-treatment girth:  Post-treatment girth:  Measured at (location):  Pressure:       [] lo [] med [] hi   Temperature: [] lo [] med [] hi   [] Skin assessment post-treatment:  []intact []redness- no adverse reaction    []redness - adverse reaction:     15 min Therapeutic Exercise:  [x] See flow sheet :   Rationale: increase ROM and increase strength to improve the patients ability to increase A/ROM and decrease pain with movement. 10 min Therapeutic Activity:  [x]  See flow sheet :   Rationale: increase strength and improve coordination  to improve the patients ability to Tolerate basic ADLs and job-related tasks without pain. 10 min Neuromuscular Re-education:  [x]  See flow sheet :   Rationale: improve coordination, improve balance and increase proprioception  to improve the patients ability to perform activities with good form and proprioception with tactile and verbal cuing appropriately. 10 min Manual Therapy:  Trigger point release and DTM to Right-sided lumbar paraspinals   The manual therapy interventions were performed at a separate and distinct time from the therapeutic activities interventions. Rationale: decrease pain, increase ROM, increase tissue extensibility and decrease edema  to decrease Right lumbar paraspinal muscle turgor. With   [x] TE   [x] TA   [x] neuro   [] other: Patient Education: [x] Review HEP    [x] Progressed/Changed HEP based on:   [x] positioning   [] body mechanics   [] transfers   [] heat/ice application    [] other:      Other Objective/Functional Measures: About 5 degrees lumbar extension in prone     Pain Level (0-10 scale) post treatment: 0/10    ASSESSMENT/Changes in Function: Patient states she has not been doing her home exercise program but is in the contemplation phase of behavioral change.  Patient education on movement is medicine and motion is lotion    Patient will continue to benefit from skilled PT services to modify and progress therapeutic interventions, address functional mobility deficits, address ROM deficits, address strength deficits, analyze and address soft tissue restrictions, analyze and cue movement patterns, analyze and modify body mechanics/ergonomics, assess and modify postural abnormalities, address imbalance/dizziness and instruct in home and community integration to attain remaining goals.      [x]  See Plan of Care  []  See progress note/recertification  []  See Discharge Summary         Progress towards goals / Updated goals:  Short Term Goals: To be accomplished in 4 treatments:        3 patient will have established and be I with HEP to aid with I and self management at discharge               EVAL issued HEP               CURRENT:Progressing: not doing it yet, but thinking about it      2 patient will have pain 3/10 to aid with increase tolerance to participating in recreation-  yoga               EVAL 4 , limited a little               NAJUANO not doing it, no change  Long Term Goals: To be accomplished in 8 treatments:               1 patient will have pain 1/10 to aid with increase tolerance to participating in recreation-  yoga               EVAL 4 , limited a little               ABDQKND               2 patient will have FOTO 69 to show projected increased gains in function and tolerance to ADLS and activities at home and work               EVAL 61               BNQHFJT               3 patient will have tolerance to stoop and lift 20# 5X for carryover to lifting heavy items               EVAL limited a lot               HWRXYRZ               4 patient will report overall 50% improvement to aid with increase tolerance to yoga and work demands               EVAL pain worse with bending and yard work               VNFJNMO      PLAN  [x]  Upgrade activities as tolerated     [x]  Continue plan of care  []  Update interventions per flow sheet       []  Discharge due to:_  []  Other:_      Kim Gallegos, PT 3/25/2022  11:37 AM    Future Appointments   Date Time Provider John Ravi   3/28/2022 11:15 AM J Luis Biswas, DPT [facility]   4/1/2022  9:45 AM Lyudmila Winkler PT [facility]   4/4/2022 10:30 AM Selina Cline PTA [facility]   4/8/2022  9:45 AM Jhony Bernal, PT [facility]   4/11/2022 10:30 AM Jhony Bernal, PT [facility]   7/12/2022  8:00 AM Macarena Lau MD [facility]

## 2022-03-28 ENCOUNTER — HOSPITAL ENCOUNTER (OUTPATIENT)
Dept: PHYSICAL THERAPY | Age: 62
Discharge: HOME | End: 2022-03-28
Attending: STUDENT IN AN ORGANIZED HEALTH CARE EDUCATION/TRAINING PROGRAM
Payer: COMMERCIAL

## 2022-03-28 PROCEDURE — 97110 THERAPEUTIC EXERCISES: CPT

## 2022-03-28 PROCEDURE — 97112 NEUROMUSCULAR REEDUCATION: CPT

## 2022-03-28 PROCEDURE — 97140 MANUAL THERAPY 1/> REGIONS: CPT

## 2022-03-28 NOTE — PROGRESS NOTES
PT DAILY TREATMENT NOTE     Patient Name: Camila Ramirez  Date:3/28/2022  : 1960  [x]  Patient  Verified  Payor: Real Joann / Plan: [address] [place] [employer] / Product Type: HMO /    In time: 11:15A  Out time:12:10P  Total Treatment Time (min): 55  Visit #: 4 of 8    Treatment Area: Other low back pain [M54.59]    SUBJECTIVE  Pain Level (0-10 scale): 3  Any medication changes, allergies to medications, adverse drug reactions, diagnosis change, or new procedure performed?: [x] No    [] Yes (see summary sheet for update)  Subjective functional status/changes:   [] No changes reported  Pt reports experiencing \"usual pain, nothing out of the ordinary\" upon arrival to today's session. Reports decreased compliance with prescribed HEP.     OBJECTIVE    Modality rationale: decrease pain and increase tissue extensibility to improve the patients ability to perform ADL/IADLs with greater ease    Min Type Additional Details    [] Estim:  []Unatt       []IFC  []Premod                        []Other:  []w/ice   []w/heat  Position:  Location:    [] Estim: []Att    []TENS instruct  []NMES                    []Other:  []w/US   []w/ice   []w/heat  Position:  Location:    []  Traction: [] Cervical       []Lumbar                       [] Prone          []Supine                       []Intermittent   []Continuous Lbs:  [] before manual  [] after manual    []  Ultrasound: []Continuous   [] Pulsed                           []1MHz   []3MHz W/cm2:  Location:    []  Iontophoresis with dexamethasone         Location: [] Take home patch   [] In clinic   10 []  Ice     [x]  heat  []  Ice massage  []  Laser   []  Anodyne Position: prone w/ bolster under B ankle  Location: low back/B Hip     []  Laser with stim  []  Other:  Position:  Location:    []  Vasopneumatic Device    []  Right     []  Left  Pre-treatment girth:  Post-treatment girth:  Measured at (location):  Pressure:       [] lo [] med [] hi   Temperature: [] lo [] med [] hi     23 min Therapeutic Exercise:  [x] See flow sheet :   Rationale: increase ROM and increase strength to improve the patients ability to perform ADLs with greater ease     13 min Neuromuscular Re-education:  [x]  See flow sheet :   Rationale: increase strength, improve coordination and increase proprioception  to improve the patients ability to complete recreational activities with greater ease     9 min Manual Therapy:  (prone): STM/TPr to right lumbar paraspinal, QL and glute med, manual stretching of hip IR/ER    The manual therapy interventions were performed at a separate and distinct time from the therapeutic activities interventions. Rationale: decrease pain, increase ROM, increase tissue extensibility and decrease trigger points to increase tolerance to therex and return to PLOF           With   [x] TE   [] TA   [x] neuro   [] other: Patient Education: [x] Review HEP    [] Progressed/Changed HEP based on:   [] positioning   [] body mechanics   [] transfers   [] heat/ice application    [x] other: Ed/provided strategies to increase compliance with prescribed HEP - pt verbalizes knowledge and understanding of this      Other Objective/Functional Measures:   Progressed ex program per flow sheet       Pain Level (0-10 scale) post treatment: 0    ASSESSMENT/Changes in Function: Initiated session w/ MT techniques listed above followed by progression of ex program through added core stability ex in quadruped position - occaitional cuing required to optimize glute activation/maintain neutral spine w/ improved performance noted upon removal of cuing. Pt with positive response to session reporting a complete reduction in pain levels. Will continue to progress activity program as able and tolerated.       Patient will continue to benefit from skilled PT services to modify and progress therapeutic interventions, address functional mobility deficits, address ROM deficits, address strength deficits, analyze and address soft tissue restrictions, analyze and cue movement patterns, analyze and modify body mechanics/ergonomics, assess and modify postural abnormalities and instruct in home and community integration to attain remaining goals. [x]  See Plan of Care  []  See progress note/recertification  []  See Discharge Summary         Progress towards goals / Updated goals:  Short Term Goals: To be accomplished in 4 treatments:               1 patient will have established and be I with HEP to aid with I and self management at discharge               EVAL issued HEP               CURRENT:Progressing: Initiated, however not ind/fully compliant at this time, 03/28/22               2 patient will have pain 3/10 to aid with increase tolerance to participating in recreation-  yoga               EVAL 4 , limited a little               CURRENT  Long Term Goals:  To be accomplished in 8 treatments:               1 patient will have pain 1/10 to aid with increase tolerance to participating in recreation-  yoga               EVAL 4 , limited a little               CURRENT               2 patient will have FOTO 69 to show projected increased gains in function and tolerance to ADLS and activities at home and work               EVAL 61               CURRENT               3 patient will have tolerance to stoop and lift 20# 5X for carryover to lifting heavy items               EVAL limited a lot               CURRENT               4 patient will report overall 50% improvement to aid with increase tolerance to yoga and work demands               EVAL pain worse with bending and yard work               CURRENT      PLAN  [x]  Upgrade activities as tolerated     [x]  Continue plan of care  [x]  Update interventions per flow sheet       []  Discharge due to:_  []  Other:_      Nestor Christy DPT 3/28/2022  11:25 AM    Future Appointments   Date Time Provider John Ravi   4/1/2022  9:45 AM Addy Gonzales PT [facility]   4/4/2022 10:30 AM Mary Foss Parthaburn Grief [facility]   4/8/2022  9:45 AM Ana Al PT [facility]   4/11/2022 10:30 AM Ana Al PT [facility]   7/12/2022  8:00 AM Harper Lau MD [facility]

## 2022-04-01 ENCOUNTER — HOSPITAL ENCOUNTER (OUTPATIENT)
Dept: PHYSICAL THERAPY | Age: 62
Discharge: HOME | End: 2022-04-01
Attending: STUDENT IN AN ORGANIZED HEALTH CARE EDUCATION/TRAINING PROGRAM
Payer: COMMERCIAL

## 2022-04-01 PROCEDURE — 97530 THERAPEUTIC ACTIVITIES: CPT

## 2022-04-01 PROCEDURE — 97112 NEUROMUSCULAR REEDUCATION: CPT

## 2022-04-01 PROCEDURE — 97110 THERAPEUTIC EXERCISES: CPT

## 2022-04-01 NOTE — PROGRESS NOTES
PT DAILY TREATMENT NOTE     Patient Name: Anda Collet  [id number]  : 1960  [x]  Patient  Verified  Payor: August Gomez / Plan: [address] [employer] / Product Type: HMO /    In time:948  Out time:1045  Total Treatment Time (min): 57  Visit #: 5 of 8     Treatment Area: Other low back pain [M54.59]    SUBJECTIVE  Pain Level (0-10 scale): 3  Any medication changes, allergies to medications, adverse drug reactions, diagnosis change, or new procedure performed?: [x] No    [] Yes (see summary sheet for update)  Subjective functional status/changes:   [] No changes reported  \"I am a little sore today. \"    OBJECTIVE    Modality rationale: decrease pain and increase tissue extensibility to improve the patients ability to tolerate ADLs and activities   Min Type Additional Details    [] Estim:  []Unatt       []IFC  []Premod                        []Other:  []w/ice   []w/heat  Position:  Location:    [] Estim: []Att    []TENS instruct  []NMES                    []Other:  []w/US   []w/ice   []w/heat  Position:  Location:    []  Traction: [] Cervical       []Lumbar                       [] Prone          []Supine                       []Intermittent   []Continuous Lbs:  [] before manual  [] after manual    []  Ultrasound: []Continuous   [] Pulsed                           []1MHz   []3MHz W/cm2:  Location:    []  Iontophoresis with dexamethasone         Location: [] Take home patch   [] In clinic   10 []  Ice     [x]  Heat  post  []  Ice massage  []  Laser   []  Anodyne Position:prone with bolster under ankles  Location: B LS    []  Laser with stim  []  Other:  Position:  Location:    []  Vasopneumatic Device    []  Right     []  Left  Pre-treatment girth:  Post-treatment girth:  Measured at (location):  Pressure:       [] lo [] med [] hi   Temperature: [] lo [] med [] hi   [] Skin assessment post-treatment:  []intact []redness- no adverse reaction    []redness  adverse reaction:          23 min Therapeutic Exercise: [x] See flow sheet :   Rationale: increase ROM, increase strength and improve coordination to improve the patients ability to tolerate ADLs and activities    10 min Therapeutic Activity:  [x]  See flow sheet :   Rationale: increase ROM, increase strength and improve coordination  to improve the patients ability to tolerate ADLS and activities      14 min Neuromuscular Re-education:  [x]  See flow sheet :   Rationale: increase ROM, increase strength and improve coordination  to improve the patients ability to tolerate ADLS and activities           With   [x] TE   [x] TA   [x] neuro   [] other: Patient Education: [x] Review HEP    [x] Progressed/Changed HEP based on:   [] positioning   [] body mechanics   [] transfers   [] heat/ice application    [] other:      Other Objective/Functional Measures: VC exercises and tech. TM .24 distance  Added palloff press 10# 2X10  Added Leg press DL 60# and SL 50# 2X10  Added fire hydrant 10X B   Instructed in purple ball 3 way     Pain Level (0-10 scale) post treatment: <3    ASSESSMENT/Changes in Function: tolerated well. No increase pain with addition of exercises- showing increase tolerance to core strengthening exercises    Patient will continue to benefit from skilled PT services to modify and progress therapeutic interventions, address ROM deficits, address strength deficits, analyze and address soft tissue restrictions, analyze and cue movement patterns, analyze and modify body mechanics/ergonomics, assess and modify postural abnormalities and instruct in home and community integration to attain remaining goals.      [x]  See Plan of Care  []  See progress note/recertification  []  See Discharge Summary         Progress towards goals / Updated goals:  Short Term Goals: To be accomplished in 4 treatments:               5 patient will have established and be I with HEP to aid with I and self management at discharge               EVAL issued HEP               CURRENT:Progressing: Initiated, however not ind/fully compliant at this time, 4/1/22               2 patient will have pain 3/10 to aid with increase tolerance to participating in recreation-  yoga               EVAL 4 , limited a little               HTPOBNZ 3 4/1/22  Long Term Goals: To be accomplished in 8 treatments:               1 patient will have pain 1/10 to aid with increase tolerance to participating in recreation-  yoga               EVAL 4 , limited a little               CURRENT 3 4/1/22               2 patient will have FOTO 69 to show projected increased gains in function and tolerance to ADLS and activities at home and work               EVAL  ongoing [id number] 4/1/22               3 patient will have tolerance to stoop and lift 20# 5X for carryover to lifting heavy items               EVAL limited a lot               UQVISBR ongoing NA 4/1/22               4 patient will report overall 50% improvement to aid with increase tolerance to yoga and work demands               EVAL pain worse with bending and yard work               IHUTCMQ  ongoing NA today 4/1/22    PLAN  [x]  Upgrade activities as tolerated     [x]  Continue plan of care  []  Update interventions per flow sheet       []  Discharge due to:_  []  Other:_      Karen Horton, PT 4/1/2022  9:49 AM    Future Appointments   Date Time Provider John Ravi   4/4/2022 10:30 AM Kelly Osborne PTA [facility]   4/8/2022  9:45 AM Sania Fraser PT [facility]   4/11/2022 10:30 AM Sania Fraser PT [facility]   7/12/2022  8:00 AM Fanta Lau MD [facility]

## 2022-04-04 ENCOUNTER — HOSPITAL ENCOUNTER (OUTPATIENT)
Dept: PHYSICAL THERAPY | Age: 62
Discharge: HOME | End: 2022-04-04
Attending: STUDENT IN AN ORGANIZED HEALTH CARE EDUCATION/TRAINING PROGRAM
Payer: COMMERCIAL

## 2022-04-04 PROCEDURE — 97110 THERAPEUTIC EXERCISES: CPT

## 2022-04-04 PROCEDURE — 97530 THERAPEUTIC ACTIVITIES: CPT

## 2022-04-04 PROCEDURE — 97140 MANUAL THERAPY 1/> REGIONS: CPT

## 2022-04-04 PROCEDURE — 97112 NEUROMUSCULAR REEDUCATION: CPT

## 2022-04-04 NOTE — PROGRESS NOTES
PT DAILY TREATMENT NOTE     Patient Name: Carrington Matson  Date:2022  : 1960  [x]  Patient  Verified  Payor: Mildred Angel / Plan: Dilia Barba HMO / Product Type: HMO /    In time: 1030 am   Out time 1130 am   Total Treatment Time (min): 60   Visit #: 6 of 8     Treatment Area: Other low back pain [M54.59]    SUBJECTIVE  Pain Level (0-10 scale): 3/10   Any medication changes, allergies to medications, adverse drug reactions, diagnosis change, or new procedure performed?: [x] No    [] Yes (see summary sheet for update)  Subjective functional status/changes:   [] No changes reported  Patient reports that most of her pain is in her low back today. Patient denies any pain into her legs today. OBJECTIVE    Modality rationale: decrease pain and increase tissue extensibility to improve the patients ability to assist with performing ADL's and functional tasks without limitations.    Min Type Additional Details    [] Estim:  []Unatt       []IFC  []Premod                        []Other:  []w/ice   []w/heat  Position:  Location:    [] Estim: []Att    []TENS instruct  []NMES                    []Other:  []w/US   []w/ice   []w/heat  Position:  Location:    []  Traction: [] Cervical       []Lumbar                       [] Prone          []Supine                       []Intermittent   []Continuous Lbs:  [] before manual  [] after manual    []  Ultrasound: []Continuous   [] Pulsed                           []1MHz   []3MHz W/cm2:  Location:    []  Iontophoresis with dexamethasone         Location: [] Take home patch   [] In clinic   10 []  Ice     [x]  heat  []  Ice massage  []  Laser   []  Anodyne Position:prone  Location: L/S     []  Laser with stim  []  Other:  Position:  Location:    []  Vasopneumatic Device    []  Right     []  Left  Pre-treatment girth:  Post-treatment girth:  Measured at (location):  Pressure:       [] lo [] med [] hi   Temperature: [] lo [] med [] hi   [] Skin assessment post-treatment: []intact []redness- no adverse reaction  []redness  adverse reaction:     15 min Therapeutic Exercise:  [x] See flow sheet :   Rationale: increase ROM and increase strength to improve the patients overall muscle endurance and activity tolerance for ADL's and functional tasks. 8 min Therapeutic Activity:  [x]  See flow sheet :   Rationale: increase strength and improve coordination  to improve the patients ability to safely perform dynamic activities and to improve functional performance of ADL's. 15 min Neuromuscular Re-education:  []  See flow sheet :   Rationale: increase strength, improve coordination and improve balance  to improve the patients ability to perform activities with good form, stability and proprioception. 12 min Manual Therapy:  Shot gun mobs/MET; DTM and stretching (B) piriformis and glute med   The manual therapy interventions were performed at a separate and distinct time from the therapeutic activities interventions. Rationale: decrease pain, increase ROM, increase tissue extensibility, decrease trigger points and increase postural awareness to assist with reducing SI joint dysfunction. With   [] TE   [] TA   [] neuro   [] other: Patient Education: [x] Review HEP    [] Progressed/Changed HEP based on:   [] positioning   [] body mechanics   [] transfers   [] heat/ice application    [] other:      Other Objective/Functional Measures: Added OTB to hip 3 way; mini squats    Left posteriorly rotated SI joint    Pain Level (0-10 scale) post treatment: 1/10     ASSESSMENT/Changes in Function:   Patient is progressing well towards goals. Progressed exercises, as per flow sheet, to assist with increasing (B) LE and core strength. Patient tolerated today's new exercise well. Verbal cues were required for from with some of today's exercises. Patient responded well to today's session, as evident by, reduction in SI joint dysfunction and decreased low back pain.  Patient will continue to benefit from skilled PT services to modify and progress therapeutic interventions, address functional mobility deficits, address ROM deficits, address strength deficits, analyze and address soft tissue restrictions, analyze and cue movement patterns, analyze and modify body mechanics/ergonomics, assess and modify postural abnormalities and instruct in home and community integration to attain remaining goals.      []  See Plan of Care  []  See progress note/recertification  []  See Discharge Summary         Progress towards goals / Updated goals:  Short Term Goals: To be accomplished in 4 treatments:               0 patient will have established and be I with HEP to aid with I and self management at discharge               EVAL issued HEP               CURRENT:Progressing: Initiated, however not ind/fully compliant at this time, 4/1/22               2 patient will have pain 3/10 to aid with increase tolerance to participating in recreation- [employer]               EVAL 4 , limited a little               BXOJNDH 3 4/1/22  [facility]. be accomplished in 8 treatments:               1 patient will have pain 1/10 to aid with increase tolerance to participating in recreation- [employer]               EVAL 4 , limited a little               ZQWFGED 3 4/1/22               2 patient will have FOTO 69 to show projected increased gains in function and tolerance to ADLS and activities at home and work               EVAL 61               KDEZDPZ ongoing [id number] [facility] 4/1/22               3 patient will have tolerance to stoop and lift 20# 5X for carryover to lifting heavy items               EVAL limited a lot               JKVVFCE ongoing NA 4/1/22               4 patient will report overall 50% improvement to aid with increase tolerance to yoga and work demands               EVAL pain worse with bending and yard work               SPSBSIM  ongoing NA today 4/1/22     PLAN  [x]  Upgrade activities as tolerated     [x]  Continue plan of care  []  Update interventions per flow sheet       []  Discharge due to:_  []  Other:_      Klarissa Chang, PTA 4/4/2022  10:54 AM    Future Appointments   Date Time Provider John Ravi   4/8/2022  9:45 AM Brigida Marie, PT [facility] [address] [facility]   4/11/2022 10:30 AM Allredgalen Marie, PT [facility] [address] [facility]   7/12/2022  8:00 AM Chris Lau MD [facility]

## 2022-04-08 ENCOUNTER — HOSPITAL ENCOUNTER (OUTPATIENT)
Dept: PHYSICAL THERAPY | Age: 62
Discharge: HOME | End: 2022-04-08
Attending: STUDENT IN AN ORGANIZED HEALTH CARE EDUCATION/TRAINING PROGRAM
Payer: COMMERCIAL

## 2022-04-08 PROCEDURE — 97140 MANUAL THERAPY 1/> REGIONS: CPT

## 2022-04-08 PROCEDURE — 97112 NEUROMUSCULAR REEDUCATION: CPT

## 2022-04-08 PROCEDURE — 97110 THERAPEUTIC EXERCISES: CPT

## 2022-04-08 PROCEDURE — 97530 THERAPEUTIC ACTIVITIES: CPT

## 2022-04-08 NOTE — PROGRESS NOTES
PT DAILY TREATMENT NOTE     Patient Name: Emily Ferreira  Date:2022  : 1960  [x]  Patient  Verified  Payor: Trevin Dumont / Plan: [address] [employer] / Product Type: HMO /    In time:950  Out time:1045  Total Treatment Time (min): 55  Visit #: 7 of 8   Treatment Area: Other low back pain [M54.59]    SUBJECTIVE  Pain Level (0-10 scale): 3  Any medication changes, allergies to medications, adverse drug reactions, diagnosis change, or new procedure performed?: [x] No    [] Yes (see summary sheet for update)  Subjective functional status/changes:   [] No changes reported  Reports she is doing pretty good today.      OBJECTIVE    Modality rationale: decrease pain and increase tissue extensibility to improve the patients ability to tolerate ADLs and activities   Min Type Additional Details    [] Estim:  []Unatt       []IFC  []Premod                        []Other:  []w/ice   []w/heat  Position:  Location:    [] Estim: []Att    []TENS instruct  []NMES                    []Other:  []w/US   []w/ice   []w/heat  Position:  Location:    []  Traction: [] Cervical       []Lumbar                       [] Prone          []Supine                       []Intermittent   []Continuous Lbs:  [] before manual  [] after manual    []  Ultrasound: []Continuous   [] Pulsed                           []1MHz   []3MHz W/cm2:  Location:    []  Iontophoresis with dexamethasone         Location: [] Take home patch   [] In clinic   10 []  Ice     [x]  heat  post  []  Ice massage  []  Laser   []  Anodyne Position:prone  Location:B LS    []  Laser with stim  []  Other:  Position:  Location:    []  Vasopneumatic Device    []  Right     []  Left  Pre-treatment girth:  Post-treatment girth:  Measured at (location):  Pressure:       [] lo [] med [] hi   Temperature: [] lo [] med [] hi   [] Skin assessment post-treatment:  []intact []redness- no adverse reaction    []redness  adverse reaction:          19 min Therapeutic Exercise:  [x] See flow sheet :   Rationale: increase ROM, increase strength and improve coordination to improve the patients ability to tolerate ADLs and activities    8 min Therapeutic Activity:  [x]  See flow sheet :   Rationale: increase ROM, increase strength, improve coordination, improve balance and increase proprioception  to improve the patients ability to tolerate ADLS and activities     15 min Neuromuscular Re-education:  [x]  See flow sheet :   Rationale: increase ROM, increase strength and improve coordination  to improve the patients ability to tolerate ADLS and activities    8 min Mahnual Therapy:  Shot gun mobs , alignment check, B Piriformis TPR    The manual therapy interventions were performed at a separate and distinct time from the therapeutic activities interventions. Rationale: decrease pain, increase ROM, increase tissue extensibility and decrease trigger points to tolerate ADLs and activities           With   [] TE   [] TA   [] neuro   [] other: Patient Education: [x] Review HEP    [] Progressed/Changed HEP based on:   [] positioning   [] body mechanics   [] transfers   [] heat/ice application    [] other:      Other Objective/Functional Measures: VC exercises and technique  TM . 25 miles in 6 minutes at 2.5 mph   Increased 3 way hip orange band to 12X each  Increased mini squats to 15X       Pain Level (0-10 scale) post treatment: <3    ASSESSMENT/Changes in Function: tolerated well with VC for bird dog and paffoff press. Decrease STC and notes benefit of B piriformis release    Patient will continue to benefit from skilled PT services to modify and progress therapeutic interventions, address ROM deficits, address strength deficits, analyze and address soft tissue restrictions, analyze and cue movement patterns, analyze and modify body mechanics/ergonomics, assess and modify postural abnormalities and instruct in home and community integration to attain remaining goals.      [x]  See Plan of Care  []  See progress note/recertification  []  See Discharge Summary         Progress towards goals / Updated goals:  Short Term Goals: To be accomplished in 4 treatments:               9 patient will have established and be I with HEP to aid with I and self management at discharge               EVAL issued HEP               CURRENT:Progressing: Initiated, however not ind/fully compliant at this time, 4/1/22   Reports compliance 4/8/22               2 patient will have pain 3/10 to aid with increase tolerance to participating in recreation-  yoga               EVAL 4 , limited a little               CURRENT 3 4/8/22  Long Term Goals: To be accomplished in 8 treatments:               1 patient will have pain 1/10 to aid with increase tolerance to participating in recreation- [employer]               EVAL 4 , limited a little               CURRENT 3 4/8/22               2 patient will have FOTO 69 to show projected increased gains in function and tolerance to ADLS and activities at home and work               EVAL 61               CURRENT ongoing NA recheck next session 4/8/22               3 patient will have tolerance to stoop and lift 20# 5X for carryover to lifting heavy items               EVAL limited a lot               CURRENT ongoing NA 4/8/22               4 patient will report overall 50% improvement to aid with increase tolerance to yoga and work demands               EVAL pain worse with bending and yard work               OHVGDGN  ongoing NA today 4/8/22       PLAN  [x]  Upgrade activities as tolerated     [x]  Continue plan of care  []  Update interventions per flow sheet       []  Discharge due to:_  [x]  Other:_  Recheck next session for MD note and add stoop and lift  Courtney Holder, PT 4/8/2022  9:47 AM    Future Appointments   Date Time Provider John Ravi   4/11/2022 10:30 [facility]   7/12/2022  8:00 AM Vadim Lau MD [facility]

## 2022-04-11 ENCOUNTER — HOSPITAL ENCOUNTER (OUTPATIENT)
Dept: PHYSICAL THERAPY | Age: 62
Discharge: HOME | End: 2022-04-11
Attending: STUDENT IN AN ORGANIZED HEALTH CARE EDUCATION/TRAINING PROGRAM
Payer: COMMERCIAL

## 2022-04-11 PROCEDURE — 97530 THERAPEUTIC ACTIVITIES: CPT

## 2022-04-11 PROCEDURE — 97140 MANUAL THERAPY 1/> REGIONS: CPT

## 2022-04-11 PROCEDURE — 97112 NEUROMUSCULAR REEDUCATION: CPT

## 2022-04-11 PROCEDURE — 97110 THERAPEUTIC EXERCISES: CPT

## 2022-04-11 NOTE — PROGRESS NOTES
Physical Therapy Discharge Instructions      [facility] [address], [address] [place] [address], [address]  [phone] fax    Patient: Rosy   : 1960      Continue Home Exercise Program 1-2 times per day for 2 weeks, then decrease to 3-5 times per week      Continue with    [x] Ice  as needed PRN times per day     [x] Heat           Follow up with MD:     [] Upon completion of therapy     [x] As needed      Recommendations:     [x]   Return to activity with home program    []   Return to activity with the following modifications:       []Post Rehab Program    []Join Independent aquatic program     []Return to/join local gym        Additional Comments:    Jada Boswell, PT 2022 10:55 AM

## 2022-04-11 NOTE — PROGRESS NOTES
PT DISCHARGE DAILY NOTE AND [id number]    Patient name: Azael Dobbs Start of Care: 3/15/22   Referral source: Orin Kern MD : 1960   Medical/Treatment Diagnosis: Other low back pain [M54.59] Onset Date:longstanding, 2022     Prior Hospitalization: see medical history Provider#: [id number]   Medications: Verified on Patient Summary List    Comorbidities: weight change recently   Prior Level of Function: longstanding Hx of LBP with baseline pain levels, describes recurrent episodes of increase / decrease pain, but still able to tolerate ADLS and activities, no AD, working, drives, Yoga, active lifestyle   Visits from Start of Care: 8    Missed Visits: 1    Reporting Period : 3/15/22 to 22    Date:2022  : 1960  [x]  Patient  Verified  Payor: Yesenia Davalos / Plan: Ovidio Roberson HMO / Product Type: HMO /    In time:1033  Out time:1136  Total Treatment Time (min): 61  Visit #: 8 of 8    Medicare/[facility] Only   Total Timed Codes (min):  53 1:1 Treatment Time:  53       SUBJECTIVE  Pain Level (0-10 scale): 3  Any medication changes, allergies to medications, adverse drug reactions, diagnosis change, or new procedure performed?: [x] No    [] Yes (see summary sheet for update)  Subjective functional status/changes:   [] No changes reported  \"I think I am ready to go on my own. I am going to miss the pressure point release you do. I can't find a good way to do that on my own at home. \"      OBJECTIVE    Modality rationale: decrease pain and increase tissue extensibility to improve the patients ability to tolerate ADLs and activities   Min Type Additional Details    [] Estim:  []Unatt       []IFC  []Premod                        []Other:  []w/ice   []w/heat  Position:  Location:    [] Estim: []Att    []TENS instruct  []NMES                    []Other:  []w/US   []w/ice   []w/heat  Position:  Location:    []  Traction: [] Cervical       []Lumbar                       [] Prone          []Supine []Intermittent   []Continuous Lbs:  [] before manual  [] after manual    []  Ultrasound: []Continuous   [] Pulsed                           []1MHz   []3MHz W/cm2:  Location:    []  Iontophoresis with dexamethasone         Location: [] Take home patch   [] In clinic   10 []  Ice     [x]  Heat post   []  Ice massage  []  Laser   []  Anodyne Position; prone   Location:Gluts/ lower LS    []  Laser with stim  []  Other:  Position:  Location:    []  Vasopneumatic Device    []  Right     []  Left  Pre-treatment girth:  Post-treatment girth:  Measured at (location):  Pressure:       [] lo [] med [] hi   Temperature: [] lo [] med [] hi   [] Skin assessment post-treatment:  []intact []redness- no adverse reaction    []redness  adverse reaction:      15 min Therapeutic Exercise:  [x] See flow sheet :   Rationale: increase ROM, increase strength and improve coordination to improve the patients ability to tolerate ADLS and activities    15 min Therapeutic Activity:  [x]  See flow sheet :   Rationale: increase ROM, increase strength and improve coordination  to improve the patients ability to tolerate ADLS and activities     15 min Neuromuscular Re-education:  [x]  See flow sheet :   Rationale: increase ROM, increase strength and improve coordination  to improve the patients ability to tolerate ADLS and activities    8 min Manual Therapy:  B Piriformis release, in prone    The manual therapy interventions were performed at a separate and distinct time from the therapeutic activities interventions.   Rationale: decrease pain, increase ROM, increase tissue extensibility and decrease trigger points to tolerate ADLS and activities           With   [] TE   [] TA   [] neuro   [] other: Patient Education: [x] Review HEP    [] Progressed/Changed HEP based on:   [] positioning   [] body mechanics   [] transfers   [] heat/ice application    [] other:      Other Objective/Functional Measures: VC exercises and technique Pain Level (0-10 scale) post treatment: <3    Summary of Care:  Short Term Goals: To be accomplished in 4 treatments:               1 patient will have established and be I with HEP to aid with I and self management at discharge               EVAL issued HEP               UFYSYXQ:  Reports compliance 4/11/22               2 patient will have pain 3/10 to aid with increase tolerance to participating in recreation-  yoga               EVAL 4 , limited a little               RFNXZCH [address] be accomplished in 8 treatments:               1 patient will have pain 1/10 to aid with increase tolerance to participating in recreation-  yoga               EVAL 4 , limited a little               CURRENT 3 411/22               2 patient will have FOTO 69 to show projected increased gains in function and tolerance to ADLS and activities at home and work               EVAL [phone] 4/11/22               3 patient will have tolerance to stoop and lift 20# 5X for carryover to lifting heavy items               EVAL limited a lot               CURRENT met 4/11/22               4 patient will report overall 50% improvement to aid with increase tolerance to yoga and work demands               EVAL pain worse with bending and yard work               AHVDGWS: 50%  4/11/22   ASSESSMENT/Changes in Function: tolerated well. Discharge instructions. Patient ready to attempt self management . Residual left > right piriformis soft tissue congestion today. Notes benefit of piriformis TPR.      Thank you for this referral!      PLAN  [x]Discontinue therapy: [x]Patient has reached or is progressing toward set goals      []Patient is non-compliant or has abdicated      []Due to lack of appreciable progress towards set goals    Eli Mcgrath, PT 4/11/2022  10:52 AM

## 2022-04-28 ENCOUNTER — APPOINTMENT (OUTPATIENT)
Dept: PHYSICAL THERAPY | Age: 62
End: 2022-04-28
Attending: STUDENT IN AN ORGANIZED HEALTH CARE EDUCATION/TRAINING PROGRAM
Payer: COMMERCIAL

## 2022-05-09 DIAGNOSIS — E78.5 HYPERLIPIDEMIA, UNSPECIFIED HYPERLIPIDEMIA TYPE: ICD-10-CM

## 2022-05-09 RX ORDER — PRAVASTATIN SODIUM 10 MG/1
10 TABLET ORAL
Qty: 90 TABLET | Refills: 0 | Status: SHIPPED | OUTPATIENT
Start: 2022-05-09 | End: 2022-07-26 | Stop reason: SDUPTHER

## 2022-07-26 ENCOUNTER — OFFICE VISIT (OUTPATIENT)
Dept: FAMILY MEDICINE CLINIC | Age: 62
End: 2022-07-26
Payer: COMMERCIAL

## 2022-07-26 VITALS
TEMPERATURE: 97.6 F | RESPIRATION RATE: 18 BRPM | DIASTOLIC BLOOD PRESSURE: 81 MMHG | SYSTOLIC BLOOD PRESSURE: 134 MMHG | BODY MASS INDEX: 26.84 KG/M2 | OXYGEN SATURATION: 95 % | WEIGHT: 171 LBS | HEIGHT: 67 IN | HEART RATE: 81 BPM

## 2022-07-26 DIAGNOSIS — D70.9 NEUTROPENIA, UNSPECIFIED TYPE (HCC): ICD-10-CM

## 2022-07-26 DIAGNOSIS — E78.00 PURE HYPERCHOLESTEROLEMIA: Primary | ICD-10-CM

## 2022-07-26 DIAGNOSIS — E55.9 VITAMIN D DEFICIENCY: ICD-10-CM

## 2022-07-26 DIAGNOSIS — G89.29 CHRONIC MIDLINE LOW BACK PAIN WITHOUT SCIATICA: ICD-10-CM

## 2022-07-26 DIAGNOSIS — Z11.59 ENCOUNTER FOR HEPATITIS C SCREENING TEST FOR LOW RISK PATIENT: ICD-10-CM

## 2022-07-26 DIAGNOSIS — Z12.11 SCREENING FOR COLON CANCER: ICD-10-CM

## 2022-07-26 DIAGNOSIS — M54.50 CHRONIC MIDLINE LOW BACK PAIN WITHOUT SCIATICA: ICD-10-CM

## 2022-07-26 PROCEDURE — 99214 OFFICE O/P EST MOD 30 MIN: CPT | Performed by: STUDENT IN AN ORGANIZED HEALTH CARE EDUCATION/TRAINING PROGRAM

## 2022-07-26 RX ORDER — PRAVASTATIN SODIUM 10 MG/1
10 TABLET ORAL
Qty: 90 TABLET | Refills: 1 | Status: SHIPPED | OUTPATIENT
Start: 2022-07-26

## 2022-07-26 NOTE — PROGRESS NOTES
Sandra Dakin is a 64 y.o. female presenting today for Follow Up Chronic Condition  . Chief Complaint   Patient presents with    Follow Up Chronic Condition       HPI:  Sandra Dakin presents to the office today for follow up. HLD: Patient has a history of HLD with extensive family history of DM, HLD. She tried crestor, lipitor, and another statin but developed severe myalgias. She was switched to Zetia - with no side effects. She stopped taking it 2 yrs ago coz she felt like it wasn't doing anything. She has been exercising to stay healthy. 4 yrs ago she had intermittent chest pain while exercising - she underwent a stress test which was negative. Lipid panel in 2/22 showed , HDL 92, triglycerides 69. She was resumed on pravastatin. Patient has been tolerating it well with no myalgias. Patient states she has history of elevated LFTs. Screening for hepatitis was negative at that time. She had a liver biopsy which was negative 25 yrs ago. No cause was identified. Recent LFTs are normal.    Pt underwent a hysterectomy in 2017 after atypical cells were noted on the cervix. Chronic back pain: She used to go to a chiropracter in the past and was told she had OA. She has been taking ibuprofen intermittently which relieves the pain. Patient was referred to PT with improvement. X-ray showed moderately severe degenerative disc disease in lumbar spine. Today, patient reports intermittent blurred vision in her right eye peripherally. She had an eye exam that was normal.      Healthcare Maintenance:  - Mammogram done on 12/09/21: BIRAD1/2. Screening again in 1 year  - Colonoscopy done a few years ago - polyp was removed. Was advised to repeat in 5 yrs.          ROS  ROS:  History obtained from the patient intake forms which are reviewed with the patient  General: negative for - chills, fever, weight changes or malaise  HEENT: no sore throat, nasal congestion, vision problems or ear problems  Respiratory: no cough, shortness of breath, or wheezing  Cardiovascular: no chest pain, palpitations, or dyspnea on exertion  Gastrointestinal: no abdominal pain, N/V, change in bowel habits, or black or bloody stools  Musculoskeletal: non specific intermittent back pain and knee pain. Neurological: no numbness, tingling, headache or dizziness  Endo:  No polyuria or polydipsia. : no hematuria, dysuria, frequency, hesitancy, or nocturia.     Psychological: negative for - anxiety, depression, sleep disturbances, suicidal or homicidal ideations      Allergies   Allergen Reactions    Crestor [Rosuvastatin] Myalgia    Lipitor [Atorvastatin] Myalgia       PHQ Screening   3 most recent PHQ Screens 7/26/2022   Little interest or pleasure in doing things Not at all   Feeling down, depressed, irritable, or hopeless Not at all   Total Score PHQ 2 0       History  Past Medical History:   Diagnosis Date    Elevated LFTs     HLD (hyperlipidemia)        Past Surgical History:   Procedure Laterality Date    HX HYSTERECTOMY      HX TUBAL LIGATION         Social History     Socioeconomic History    Marital status:      Spouse name: Not on file    Number of children: Not on file    Years of education: Not on file    Highest education level: Not on file   Occupational History    Not on file   Tobacco Use    Smoking status: Never    Smokeless tobacco: Never   Vaping Use    Vaping Use: Never used   Substance and Sexual Activity    Alcohol use: Yes    Drug use: Never    Sexual activity: Not Currently   Other Topics Concern    Not on file   Social History Narrative    Not on file     Social Determinants of Health     Financial Resource Strain: Not on file   Food Insecurity: Not on file   Transportation Needs: Not on file   Physical Activity: Not on file   Stress: Not on file   Social Connections: Not on file   Intimate Partner Violence: Not on file   Housing Stability: Not on file             Vitals:    07/26/22 5138 BP: 134/81   Pulse: 81   Resp: 18   Temp: 97.6 °F (36.4 °C)   TempSrc: Temporal   SpO2: 95%   Weight: 171 lb (77.6 kg)   Height: 5' 7\" (1.702 m)   PainSc:   0 - No pain       Physical Exam  Vitals and nursing note reviewed. Constitutional:       General: She is not in acute distress. Appearance: Normal appearance. She is normal weight. She is not ill-appearing, toxic-appearing or diaphoretic. HENT:      Head: Normocephalic and atraumatic. Nose: Nose normal. No congestion or rhinorrhea. Mouth/Throat:      Mouth: Mucous membranes are moist.      Pharynx: Oropharynx is clear. No oropharyngeal exudate or posterior oropharyngeal erythema. Eyes:      General: No scleral icterus. Right eye: No discharge. Left eye: No discharge. Extraocular Movements: Extraocular movements intact. Conjunctiva/sclera: Conjunctivae normal.      Pupils: Pupils are equal, round, and reactive to light. Cardiovascular:      Rate and Rhythm: Normal rate and regular rhythm. Pulses: Normal pulses. Heart sounds: Normal heart sounds. No murmur heard. No gallop. Pulmonary:      Effort: Pulmonary effort is normal. No respiratory distress. Breath sounds: Normal breath sounds. No wheezing or rales. Abdominal:      General: Bowel sounds are normal. There is no distension. Palpations: Abdomen is soft. There is no mass. Tenderness: There is no abdominal tenderness. There is no guarding or rebound. Hernia: No hernia is present. Musculoskeletal:         General: No swelling or tenderness. Normal range of motion. Cervical back: Normal range of motion. Right lower leg: No edema. Left lower leg: No edema. Skin:     General: Skin is warm and dry. Coloration: Skin is not jaundiced or pale. Neurological:      General: No focal deficit present. Mental Status: She is alert and oriented to person, place, and time. Mental status is at baseline. Cranial Nerves: No cranial nerve deficit. Motor: No weakness. Gait: Gait normal.   Psychiatric:         Mood and Affect: Mood normal.         Behavior: Behavior normal.         Thought Content: Thought content normal.         Judgment: Judgment normal.       Abstract on 05/05/2022   Component Date Value Ref Range Status    SARS-CoV-2, DEANNE 01/09/2022 Positive   Final       No results found for any visits on 07/26/22. Patient Care Team:  Patient Care Team:  Kandis Pascual MD as PCP - General (Internal Medicine Physician)  Kandis Pascual MD as PCP - [facility][1] Provider      Assessment / Plan:      ICD-10-CM ICD-9-CM    1. Pure hypercholesterolemia  E78.00 272.0 pravastatin (PRAVACHOL) 10 mg tablet      LIPID PANEL      LIPID PANEL      DUPLEX CAROTID BILATERAL      2. Encounter for hepatitis C screening test for low risk patient  Z11.59 V73.89 HEPATITIS C AB      HEPATITIS C AB      3. Neutropenia, unspecified type ([facility] [address]  D70.9 288.00 CBC WITH AUTOMATED DIFF      CBC WITH AUTOMATED DIFF      4. Vitamin D deficiency  E55.9 268.9 VITAMIN D, 25 HYDROXY      VITAMIN D, 25 HYDROXY      5. Screening for colon cancer  Z12.11 V76.51 REFERRAL TO GASTROENTEROLOGY      6. Chronic midline low back pain without sciatica  M54.50 724.2     G89.29 338.29              HLD: Continue pravastatin. Check repeat lipid panel. Hip pain and back pain: Reports improvement with PT. Vitamin D deficiency: Check vitamin D level. Last CBC showed low WBC level. Recheck CBC. Refer to GI for colonoscopy. Healthcare Maintenance:  - Pap smear: not indicated due to hysterectomy  - Colonoscopy: Completed in 6/17. Repeat in 5 years. - Mammogram: Birad1/2. Due in 1 year - 12/22  - Received COVID vaccine      Follow-up and Dispositions    Return in about 6 months (around 1/26/2023) for Routine F/u. I asked the patient if she  had any questions and answered her  questions.   The patient stated that she understands the treatment plan and agrees with the treatment plan

## 2022-07-26 NOTE — PROGRESS NOTES
1. \"Have you been to the ER, urgent care clinic since your last visit? Hospitalized since your last visit? \" No    2. \"Have you seen or consulted any other health care providers outside of the [address] [place] [address] since your last visit? \" No     3. For patients aged 39-70: Has the patient had a colonoscopy / FIT/ Cologuard? Yes - no Care Gap present      If the patient is female:    4. For patients aged 41-77: Has the patient had a mammogram within the past 2 years? Yes - no Care Gap present      5. For patients aged 21-65: Has the patient had a pap smear?  No

## 2022-07-27 LAB
25(OH)D3 SERPL-MCNC: 41.9 NG/ML (ref 32–100)
ATYPICAL LYMPHOCYTE C MAN (DIFF), 1073: 4 %
BASOPHILS ABS-DIF,2030: 0.1 K/UL (ref 0–0.2)
BASOPHILS C MAN (DIFF), 1068: 3 % (ref 0–2)
CHOLEST SERPL-MCNC: 243 MG/DL (ref 110–200)
EOS ABS-DIF,2069: 0.2 K/UL (ref 0–0.5)
EOSINOPHILS C MAN (DIFF), 1067: 7 % (ref 0–6)
ERYTHROCYTE [DISTWIDTH] IN BLOOD BY AUTOMATED COUNT: 13.6 % (ref 10–15.5)
HCT VFR BLD AUTO: 41.7 % (ref 35.1–48)
HCV AB SER IA-ACNC: NORMAL
HDLC SERPL-MCNC: 3 MG/DL (ref 0–5)
HDLC SERPL-MCNC: 80 MG/DL
HGB BLD-MCNC: 12.9 G/DL (ref 11.7–16)
LDL/HDL RATIO,LDHD: 1.8
LDLC SERPL CALC-MCNC: 146 MG/DL (ref 50–99)
LYMPHOCYTES C MAN (DIFF), 1065: 49 % (ref 20–45)
LYMPHS ABS-DIF,2105: 1.4 K/UL (ref 1–4.8)
MCH RBC QN AUTO: 28 PG (ref 26–34)
MCHC RBC AUTO-ENTMCNC: 31 G/DL (ref 31–36)
MCV RBC AUTO: 90 FL (ref 80–99)
MONOCYTES ABS-DIF,2141: 0.2 K/UL (ref 0.1–1)
MONOCYTES C MAN (DIFF), 1066: 6 % (ref 3–12)
NEUTROPHILS ABS,2156: 0.8 K/UL (ref 1.8–7.7)
NEUTROPHILS C MAN (DIFF), 1064: 30 % (ref 40–75)
NON-HDL CHOLESTEROL, 011976: 163 MG/DL
NORMOCHROMIC CELLAVISION, 1078: ABNORMAL
NORMOCYTIC CELLAVISION, 1079: ABNORMAL
PLATELET # BLD AUTO: 218 K/UL (ref 140–440)
PMV BLD AUTO: 10.7 FL (ref 9–13)
POLYCHROMASIA,POLYCM: ABNORMAL
RBC # BLD AUTO: 4.65 M/UL (ref 3.8–5.2)
SMEAR EVAL, 1131: ABNORMAL
TRIGL SERPL-MCNC: 85 MG/DL (ref 40–149)
VLDLC SERPL CALC-MCNC: 17 MG/DL (ref 8–30)
WBC # BLD AUTO: 2.6 K/UL (ref 4–11)

## 2022-07-28 LAB — PATH REVIEW OF SMEAR, 12050: NORMAL

## 2022-07-29 ENCOUNTER — TELEPHONE (OUTPATIENT)
Dept: FAMILY MEDICINE CLINIC | Age: 62
End: 2022-07-29

## 2022-07-29 DIAGNOSIS — D70.9 NEUTROPENIA, UNSPECIFIED TYPE (HCC): Primary | ICD-10-CM

## 2022-07-29 NOTE — TELEPHONE ENCOUNTER
Discussed lab results with patient over the phone. CBC in 2/22 showed WBC 2.7 with ANC 1200. Repeat CBC shows WBC 2.6 with  along with atypical lymphocytes. Will refer to hematology for further evaluation.

## 2022-08-01 ENCOUNTER — HOSPITAL ENCOUNTER (OUTPATIENT)
Dept: VASCULAR SURGERY | Age: 62
Discharge: HOME | End: 2022-08-01
Attending: STUDENT IN AN ORGANIZED HEALTH CARE EDUCATION/TRAINING PROGRAM
Payer: COMMERCIAL

## 2022-08-01 DIAGNOSIS — E78.00 PURE HYPERCHOLESTEROLEMIA: ICD-10-CM

## 2022-08-01 LAB
LEFT CCA DIST DIAS: 37.6 CM/S
LEFT CCA DIST SYS: 107.1 CM/S
LEFT CCA MID DIAS: 39.25 CM/S
LEFT CCA MID SYS: 116.79 CM/S
LEFT CCA PROX DIAS: 31.8 CM/S
LEFT CCA PROX SYS: 90.1 CM/S
LEFT ICA DIST DIAS: 43.4 CM/S
LEFT ICA DIST SYS: 108 CM/S
LEFT ICA MID DIAS: 35.4 CM/S
LEFT ICA MID SYS: 88.7 CM/S
LEFT ICA PROX DIAS: 28.9 CM/S
LEFT ICA PROX SYS: 88.7 CM/S
LEFT ICA/CCA SYS: 1.01 NO UNITS
LEFT VERTEBRAL DIAS: 33.75 CM/S
LEFT VERTEBRAL SYS: 75.7 CM/S
RIGHT CCA DIST DIAS: 30 CM/S
RIGHT CCA DIST SYS: 93 CM/S
RIGHT CCA MID DIAS: 26.81 CM/S
RIGHT CCA MID SYS: 91.42 CM/S
RIGHT CCA PROX DIAS: 28.4 CM/S
RIGHT CCA PROX SYS: 112.4 CM/S
RIGHT ECA DIAS: 13.78 CM/S
RIGHT ECA SYS: 78.5 CM/S
RIGHT ICA DIST DIAS: 39.7 CM/S
RIGHT ICA DIST SYS: 99.2 CM/S
RIGHT ICA MID DIAS: 38.3 CM/S
RIGHT ICA MID SYS: 105.6 CM/S
RIGHT ICA PROX DIAS: 21.5 CM/S
RIGHT ICA PROX SYS: 83.6 CM/S
RIGHT ICA/CCA SYS: 1.1 NO UNITS
RIGHT VERTEBRAL DIAS: 17.54 CM/S
RIGHT VERTEBRAL SYS: 45.3 CM/S
VAS RIGHT SUBCLAVIAN PROX EDV: 0 CM/S
VAS RIGHT SUBCLAVIAN PROX PSV: 90.3 CM/S

## 2022-08-01 PROCEDURE — 93880 EXTRACRANIAL BILAT STUDY: CPT

## 2022-10-05 LAB — FECAL OCCULT BLOOD: NEGATIVE

## 2022-10-19 ENCOUNTER — OFFICE VISIT (OUTPATIENT)
Dept: ONCOLOGY | Age: 62
End: 2022-10-19
Payer: COMMERCIAL

## 2022-10-19 VITALS
OXYGEN SATURATION: 100 % | WEIGHT: 176 LBS | SYSTOLIC BLOOD PRESSURE: 136 MMHG | HEIGHT: 67 IN | DIASTOLIC BLOOD PRESSURE: 80 MMHG | HEART RATE: 72 BPM | BODY MASS INDEX: 27.62 KG/M2 | RESPIRATION RATE: 16 BRPM

## 2022-10-19 DIAGNOSIS — D70.9 NEUTROPENIA, UNSPECIFIED TYPE (HCC): Primary | ICD-10-CM

## 2022-10-19 PROCEDURE — 99204 OFFICE O/P NEW MOD 45 MIN: CPT | Performed by: INTERNAL MEDICINE

## 2022-10-19 NOTE — PROGRESS NOTES
Hematology/Oncology Consultation Note      Date: 10/19/2022    Name: Beti Damian  : 1960        Eliseo Angeles MD         Subjective:     Chief complaint: Leukopenia    History of Present Illness:   Ms. Jake Bill is a most pleasant 64y.o. year old female who was seen for consultation of Leukopenia. The patient has a past medical history significant of hyperlipidemia. 2022 CBC reported total WBC 2.6, ANC 0.8, hemoglobin 12.9, platelet 634,715. Lab reviews indicated neutropenia since at least 2022, total WBC 2.7, ANC 1.2. The patient reported on-off fatigue. The patient otherwise has no other complaints. Denied fever, chills, night sweat, unintentional weight loss, skin lumps or bumps, acute bleeding or bruising issues. Denied headache, acute vision change, dizziness, chest pain, worsen shortness of breath, palpitation, productive cough, nausea, vomiting, abdominal pain, altered bowel habits, dysuria, worsen bone pain or back pain, new focal numbness or weakness.          Past Medical History, Family History, and Social History:    Past Medical History:   Diagnosis Date    Elevated LFTs     HLD (hyperlipidemia)      Past Surgical History:   Procedure Laterality Date    HX HYSTERECTOMY      HX TUBAL LIGATION       Social History     Socioeconomic History    Marital status:      Spouse name: Not on file    Number of children: Not on file    Years of education: Not on file    Highest education level: Not on file   Occupational History    Not on file   Tobacco Use    Smoking status: Never    Smokeless tobacco: Never   Vaping Use    Vaping Use: Never used   Substance and Sexual Activity    Alcohol use: Yes    Drug use: Never    Sexual activity: Not Currently   Other Topics Concern    Not on file   Social History Narrative    Not on file     Social Determinants of Health     Financial Resource Strain: Not on file   Food Insecurity: Not on file   Transportation Needs: Not on file   Physical Activity: Not on file   Stress: Not on file   Social Connections: Not on file   Intimate Partner Violence: Not on file   Housing Stability: Not on file     Family History   Problem Relation Age of Onset    Hypertension Mother     Cancer Mother     Diabetes Mother     Cancer Father     Stroke Brother     Heart Attack Brother     Cancer Maternal Aunt      Current Outpatient Medications   Medication Sig Dispense Refill    pravastatin (PRAVACHOL) 10 mg tablet Take 1 Tablet by mouth nightly. 90 Tablet 1    Restasis MultiDose 0.05 % drop ophthalmic drops INSTILL 1 DROP IN BOTH EYES TWICE DAILY FOR 12 MONTHS         Review of Systems   Constitutional:  Negative for chills, diaphoresis, fever, malaise/fatigue and weight loss. Respiratory:  Negative for cough, hemoptysis, shortness of breath and wheezing. Cardiovascular:  Negative for chest pain, palpitations and leg swelling. Gastrointestinal:  Negative for abdominal pain, diarrhea, heartburn, nausea and vomiting. Genitourinary:  Negative for dysuria, frequency, hematuria and urgency. Musculoskeletal:  Negative for joint pain and myalgias. Skin:  Negative for itching and rash. Neurological:  Negative for dizziness, seizures, weakness and headaches. Psychiatric/Behavioral:  Negative for depression. The patient does not have insomnia. Objective:   Visit Vitals  /80   Pulse 72   Resp 16   Ht 5' 7\" (1.702 m)   Wt 79.8 kg (176 lb)   SpO2 100%   BMI 27.57 kg/m²       ECOG Performance Status (grade): 0  0 - able to carry on all pre-disease activity w/out restriction  1 - restricted but able to carry out light work  2 - ambulatory and can self- care but unable to carry out work  3 - bed or chair >50% of waking hours  4 - completely disable, total care, confined to bed or chair    Physical Exam  Constitutional:       Appearance: Normal appearance. HENT:      Head: Normocephalic and atraumatic.    Eyes:      Pupils: Pupils are equal, round, and reactive to light. Cardiovascular:      Rate and Rhythm: Normal rate and regular rhythm. Heart sounds: Normal heart sounds. Pulmonary:      Effort: Pulmonary effort is normal.      Breath sounds: Normal breath sounds. Abdominal:      General: Bowel sounds are normal.      Palpations: Abdomen is soft. Tenderness: There is no abdominal tenderness. There is no guarding. Musculoskeletal:         General: Normal range of motion. Cervical back: Neck supple. Right lower leg: No edema. Left lower leg: No edema. Skin:     General: Skin is warm. Neurological:      General: No focal deficit present. Mental Status: She is alert and oriented to person, place, and time. Mental status is at baseline. Diagnostics:      No results found for this or any previous visit (from the past 96 hour(s)). Imaging:  No results found for this or any previous visit. Results for orders placed during the hospital encounter of 03/01/22    XR SPINE LUMB 2 OR 3 V    Narrative  Lumbar spine and right hip radiographs    HISTORY: Right lower back and right hip pain. FINDINGS: Frontal and lateral imaging of the lumbar spine. Frontal pelvis and  frog-leg lateral images of the right hip. A total of 5 images. The lumbar spine demonstrates moderately severe degenerative disc changes at  L3-L4 with left lateral osteophyte and at L4-L5 with right lateral osteophyte. There are also degenerative changes at L5-S1 with prominent rightward anterior  osteophyte. Vertebral body heights are generally maintained. There is some  straightening of the lumbar lordosis. SI joints grossly intact. There is no acute fracture of the right hip. No significant arthritic change. Femoral head normal contour. No hip dislocation. Minimal osteitis pubis. Impression  Moderately severe degenerative disc disease L3-L4, L4-L5 and L5-S1. No significant arthritic change of the right hip. Osteitis pubis.     No acute fractures. No results found for this or any previous visit. Pathology          Assessment:                                        1. Neutropenia, unspecified type ([facility] [address]        Plan:                                        # Leukopenia/ Neutropenia   -- Past medical history significant of hyperlipidemia. -- 7/26/2022 CBC reported total WBC 2.6, ANC 0.8, hemoglobin 12.9, platelet 815,185.  -- Lab reviews indicated neutropenia since at least 2/28/2022, total WBC 2.7, ANC 1.2.  -- Today I have reviewed with the patient about above lab findings. The differential diagnosis of neutropenia is quite broad. A variety of infections or drugs can be associated with neutropenia. Nutritional causes of neutropenia include cachexia, debilitated state, vitamin B12 and folate deficiencies, and copper deficiency. More common, especially in asymptomatic individuals, are chronic benign neutropenia, familial neutropenia, and cyclic neutropenia. -- In this case, there is no evidence by history of an inherited neutropenia, no history of chronic or recurrent infection, and no medications that were likely to be contributing. Plan:  -- The initial workup will be included a repeat CBC with diff, ESR/ CRP, LDH, EDMUND, B12, folate, HIV, SPEP/PAT, smear review, and flow cytometry. -- The patient will be notified if any interventions is warranted. Further workup will be guided by results from aforementioned initial study. -- We will see the patient back in about 3 weeks. Always sooner if required.         Orders Placed This Encounter    METABOLIC PANEL, COMPREHENSIVE     Standing Status:   Future     Standing Expiration Date:   10/20/2023    VITAMIN B12 & FOLATE     Standing Status:   Future     Standing Expiration Date:   10/19/2023    METHYLMALONIC ACID     Standing Status:   Future     Standing Expiration Date:   10/19/2023    CBC WITH AUTOMATED DIFF     Standing Status:   Future     Standing Expiration Date:   10/20/2023    LD Standing Status:   Future     Standing Expiration Date:   10/19/2023    PERIPHERAL SMEAR     Standing Status:   Future     Standing Expiration Date:   10/19/2023    ANTINUCLEAR ANTIBODIES, IFA     Standing Status:   Future     Standing Expiration Date:   10/19/2023    HIV 1/2 AG/AB, 4TH GENERATION,W RFLX CONFIRM     Standing Status:   Future     Standing Expiration Date:   10/19/2023    GAMMOPATHY EVAL, SPEP/PAT, IG QT/FLC     Standing Status:   Future     Standing Expiration Date:   10/19/2023    FLOW CYTOMETRY, PERIPHERAL BLD     Standing Status:   Future     Standing Expiration Date:   10/19/2023    C REACTIVE PROTEIN, QT     Standing Status:   Future     Standing Expiration Date:   10/20/2023             Ms. Lisseth Richardson has a reminder for a \"due or due soon\" health maintenance. I have asked that she contact her primary care provider for follow-up on this health maintenance. All of patient's questions answered to their apparent satisfaction. They verbally show understanding and agreement with aforementioned plan. Alfredo Calero MD  10/19/2022          Above mentioned total time spent for this encounter with more than 50% of the time spent in face-to-face counseling, discussing on diagnosis and management plan going forward, and co-ordination of care. Parts of this document has been produced using Dragon dictation system. Unrecognized errors in transcription may be present. Please do not hesitate to reach out for any questions or clarifications.         CC: Darci Swan MD

## 2022-10-20 LAB
A-G RATIO,AGRAT: 1.8 RATIO (ref 1.1–2.6)
ABSOLUTE LYMPHOCYTE COUNT, 10803: 1.1 K/UL (ref 1–4.8)
ALBUMIN SERPL-MCNC: 4.6 G/DL (ref 3.5–5)
ALBUMIN, 001488: 53.5 % (ref 46.6–62.6)
ALBUMIN, 001488: 53.5 % (ref 46.6–62.6)
ALP SERPL-CCNC: 93 U/L (ref 40–120)
ALPHA-1-GLOBULIN, 001489: 3.6 % (ref 1.7–4.1)
ALPHA-1-GLOBULIN, 001489: 3.6 % (ref 1.7–4.1)
ALPHA-2-GLOBULIN, 001490: 11.1 % (ref 5.9–13.5)
ALPHA-2-GLOBULIN, 001490: 11.1 % (ref 5.9–13.5)
ALT SERPL-CCNC: 22 U/L (ref 5–40)
ANA SCREEN, 8017: NEGATIVE
ANION GAP SERPL CALC-SCNC: 12 MMOL/L (ref 3–15)
AST SERPL W P-5'-P-CCNC: 33 U/L (ref 10–37)
BASOPHILS # BLD: 0 K/UL (ref 0–0.2)
BASOPHILS NFR BLD: 1 % (ref 0–2)
BETA GLOBULIN, 001491: 15.3 % (ref 10.9–18.9)
BETA GLOBULIN, 001491: 15.3 % (ref 10.9–18.9)
BILIRUB SERPL-MCNC: 0.3 MG/DL (ref 0.2–1.2)
BUN SERPL-MCNC: 14 MG/DL (ref 6–22)
C-REACTIVE PROTEIN, QT, 006627: <0.5 MG/DL
CALCIUM SERPL-MCNC: 9.7 MG/DL (ref 8.4–10.5)
CHLORIDE SERPL-SCNC: 101 MMOL/L (ref 98–110)
CO2 SERPL-SCNC: 29 MMOL/L (ref 20–32)
CREAT SERPL-MCNC: 0.7 MG/DL (ref 0.8–1.4)
EOSINOPHIL # BLD: 0.2 K/UL (ref 0–0.5)
EOSINOPHIL NFR BLD: 6 % (ref 0–6)
ERYTHROCYTE [DISTWIDTH] IN BLOOD BY AUTOMATED COUNT: 13.6 % (ref 10–15.5)
FOLATE,FOL: >20 NG/ML
GAMMA GLOBULIN, 001492: 16.5 % (ref 11.6–24.4)
GAMMA GLOBULIN, 001492: 16.5 % (ref 11.6–24.4)
GLOBULIN,GLOB: 2.6 G/DL (ref 2–4)
GLOMERULAR FILTRATION RATE: >60 ML/MIN/1.73 SQ.M.
GLUCOSE SERPL-MCNC: 45 MG/DL (ref 70–99)
GRANULOCYTES,GRANS: 43 % (ref 40–75)
HCT VFR BLD AUTO: 41 % (ref 35.1–48)
HGB BLD-MCNC: 12.6 G/DL (ref 11.7–16)
HIV -1/0/2 AG/AB WITH REFLEX, 13463: NON REACTIVE
HIV 1 & 2 AB SER-IMP: NORMAL
IMMUNOFIXATION RESULT, SERUM, 001795: NORMAL
LDH SERPL L TO P-CCNC: 261 U/L (ref 98–192)
LYMPHOCYTES, LYMLT: 39 % (ref 20–45)
MCH RBC QN AUTO: 27 PG (ref 26–34)
MCHC RBC AUTO-ENTMCNC: 31 G/DL (ref 31–36)
MCV RBC AUTO: 89 FL (ref 80–99)
MONOCYTES # BLD: 0.3 K/UL (ref 0.1–1)
MONOCYTES NFR BLD: 11 % (ref 3–12)
NEUTROPHILS # BLD AUTO: 1.2 K/UL (ref 1.8–7.7)
PE INTERPRETATION, 107352: NORMAL
PLATELET # BLD AUTO: 199 K/UL (ref 140–440)
PMV BLD AUTO: 10.5 FL (ref 9–13)
POTASSIUM SERPL-SCNC: 4.2 MMOL/L (ref 3.5–5.5)
PROT SERPL-MCNC: 7 G/DL (ref 6.2–8.1)
PROT SERPL-MCNC: 7 G/DL (ref 6.2–8.1)
PROT SERPL-MCNC: 7.2 G/DL (ref 6.2–8.1)
RBC # BLD AUTO: 4.61 M/UL (ref 3.8–5.2)
SODIUM SERPL-SCNC: 142 MMOL/L (ref 133–145)
VIT B12 SERPL-MCNC: 865 PG/ML (ref 211–911)
WBC # BLD AUTO: 2.9 K/UL (ref 4–11)

## 2022-10-21 LAB — PATH REVIEW OF SMEAR, 12050: NORMAL

## 2022-10-24 LAB — METHYLMALONIC ACID: 150 NMOL/L (ref 0–378)

## 2022-11-10 ENCOUNTER — OFFICE VISIT (OUTPATIENT)
Dept: ONCOLOGY | Age: 62
End: 2022-11-10
Payer: COMMERCIAL

## 2022-11-10 VITALS
SYSTOLIC BLOOD PRESSURE: 135 MMHG | HEIGHT: 67 IN | TEMPERATURE: 97.9 F | DIASTOLIC BLOOD PRESSURE: 85 MMHG | WEIGHT: 178 LBS | HEART RATE: 97 BPM | BODY MASS INDEX: 27.94 KG/M2 | OXYGEN SATURATION: 98 %

## 2022-11-10 DIAGNOSIS — D72.819 LEUKOPENIA, UNSPECIFIED TYPE: ICD-10-CM

## 2022-11-10 DIAGNOSIS — D70.9 NEUTROPENIA, UNSPECIFIED TYPE (HCC): Primary | ICD-10-CM

## 2022-11-10 PROBLEM — R74.8 ELEVATED LIVER ENZYMES: Status: ACTIVE | Noted: 2022-11-10

## 2022-11-10 PROBLEM — M77.30 CALCANEAL SPUR: Status: ACTIVE | Noted: 2022-11-10

## 2022-11-10 PROCEDURE — 99214 OFFICE O/P EST MOD 30 MIN: CPT | Performed by: INTERNAL MEDICINE

## 2022-11-10 NOTE — PROGRESS NOTES
Hematology/Oncology Note      Date: 11/10/2022    Name: Margarita Key  : 1960        Darci Swan MD         Subjective:     Chief complaint: Leukopenia    History of Present Illness:   Ms. Lisseth Richardson is a most pleasant 58y.o. year old female who was seen for consultation of Leukopenia. The patient reported on-off fatigue. The patient otherwise has no other complaints. Denied fever, chills, night sweat, unintentional weight loss, skin lumps or bumps, acute bleeding or bruising issues. Denied headache, acute vision change, dizziness, chest pain, worsen shortness of breath, palpitation, productive cough, nausea, vomiting, abdominal pain, altered bowel habits, dysuria, worsen bone pain or back pain, new focal numbness or weakness.          Past Medical History, Family History, and Social History:    Past Medical History:   Diagnosis Date    Elevated LFTs     HLD (hyperlipidemia)      Past Surgical History:   Procedure Laterality Date    HX HYSTERECTOMY      HX TUBAL LIGATION       Social History     Socioeconomic History    Marital status:      Spouse name: Not on file    Number of children: Not on file    Years of education: Not on file    Highest education level: Not on file   Occupational History    Not on file   Tobacco Use    Smoking status: Never    Smokeless tobacco: Never   Vaping Use    Vaping Use: Never used   Substance and Sexual Activity    Alcohol use: Yes    Drug use: Never    Sexual activity: Not Currently   Other Topics Concern    Not on file   Social History Narrative    Not on file     Social Determinants of Health     Financial Resource Strain: Not on file   Food Insecurity: Not on file   Transportation Needs: Not on file   Physical Activity: Not on file   Stress: Not on file   Social Connections: Not on file   Intimate Partner Violence: Not on file   Housing Stability: Not on file     Family History   Problem Relation Age of Onset    Hypertension Mother     Cancer Mother Diabetes Mother     Cancer Father     Stroke Brother     Heart Attack Brother     Leukemia Maternal Aunt      Current Outpatient Medications   Medication Sig Dispense Refill    pravastatin (PRAVACHOL) 10 mg tablet Take 1 Tablet by mouth nightly. 90 Tablet 1    Restasis MultiDose 0.05 % drop ophthalmic drops INSTILL 1 DROP IN BOTH EYES TWICE DAILY FOR 12 MONTHS         Review of Systems   Constitutional:  Negative for chills, diaphoresis, fever, malaise/fatigue and weight loss. Respiratory:  Negative for cough, hemoptysis, shortness of breath and wheezing. Cardiovascular:  Negative for chest pain, palpitations and leg swelling. Gastrointestinal:  Negative for abdominal pain, diarrhea, heartburn, nausea and vomiting. Genitourinary:  Negative for dysuria, frequency, hematuria and urgency. Musculoskeletal:  Negative for joint pain and myalgias. Skin:  Negative for itching and rash. Neurological:  Negative for dizziness, seizures, weakness and headaches. Psychiatric/Behavioral:  Negative for depression. The patient does not have insomnia. Objective:   Visit Vitals  /85   Pulse 97   Temp 97.9 °F (36.6 °C)   Ht 5' 7\" (1.702 m)   Wt 80.7 kg (178 lb)   SpO2 98%   BMI 27.88 kg/m²         ECOG Performance Status (grade): 0  0 - able to carry on all pre-disease activity w/out restriction  1 - restricted but able to carry out light work  2 - ambulatory and can self- care but unable to carry out work  3 - bed or chair >50% of waking hours  4 - completely disable, total care, confined to bed or chair    Physical Exam  Constitutional:       Appearance: Normal appearance. HENT:      Head: Normocephalic and atraumatic. Eyes:      Pupils: Pupils are equal, round, and reactive to light. Cardiovascular:      Rate and Rhythm: Normal rate and regular rhythm. Heart sounds: Normal heart sounds. Pulmonary:      Effort: Pulmonary effort is normal.      Breath sounds: Normal breath sounds. Abdominal:      General: Bowel sounds are normal.      Palpations: Abdomen is soft. Tenderness: There is no abdominal tenderness. There is no guarding. Musculoskeletal:         General: Normal range of motion. Cervical back: Neck supple. Right lower leg: No edema. Left lower leg: No edema. Skin:     General: Skin is warm. Neurological:      General: No focal deficit present. Mental Status: She is alert and oriented to person, place, and time. Mental status is at baseline. Diagnostics:      No results found for this or any previous visit (from the past 96 hour(s)). Imaging:  No results found for this or any previous visit. Results for orders placed during the hospital encounter of 03/01/22    XR SPINE LUMB 2 OR 3 V    Narrative  Lumbar spine and right hip radiographs    HISTORY: Right lower back and right hip pain. FINDINGS: Frontal and lateral imaging of the lumbar spine. Frontal pelvis and  frog-leg lateral images of the right hip. A total of 5 images. The lumbar spine demonstrates moderately severe degenerative disc changes at  L3-L4 with left lateral osteophyte and at L4-L5 with right lateral osteophyte. There are also degenerative changes at L5-S1 with prominent rightward anterior  osteophyte. Vertebral body heights are generally maintained. There is some  straightening of the lumbar lordosis. SI joints grossly intact. There is no acute fracture of the right hip. No significant arthritic change. Femoral head normal contour. No hip dislocation. Minimal osteitis pubis. Impression  Moderately severe degenerative disc disease L3-L4, L4-L5 and L5-S1. No significant arthritic change of the right hip. Osteitis pubis. No acute fractures. No results found for this or any previous visit.         Pathology          Assessment:                                        1. Neutropenia, unspecified type (HCC)    2. Leukopenia, unspecified type Plan:                                        # Leukopenia/ Neutropenia   -- Past medical history significant of hyperlipidemia. -- 7/26/2022 CBC reported total WBC 2.6, ANC 0.8, hemoglobin 12.9, platelet 938,402.  -- Lab reviews indicated neutropenia since at least 2/28/2022, total WBC 2.7, ANC 1.2.  -- Today I have reviewed with the patient about recent lab reports. 10/19/2022 CBC reported total WBC was improving 2.9, ANC 1.2, hemoglobin 12.6, hematocrit 41%, platelet 157,358. Vitamin B12 865, folate > 20, . Negative EDMUND/HIV serology. SPEP reported within normal limits, no suspicious bands are seen. Peripheral blood cytometry reported no definitive immunophenotypic evidence of myeloid neoplasm or lymphoproliferative disorder. -- The etiology of leukopenia/ neutropenia was still unclear. Recent CBC showed improving WBC. In this case, there is no evidence by history of an inherited neutropenia, no history of chronic or recurrent infection, and no medications that were likely to be contributing. Plan:  -- We will continue lab check CBC with diff  -- Since these studies are negative and the neutrophil count remains >1000 without other cytopenias, a period of observation may be reasonable, with further evaluation (ie bone marrow biopsy) will be considered if the counts worsen or the patient develops recurrent infections. -- I advised the patient to call us or seek an urgent medical care if becomes symptomatic with fever, mouth sores, infections, or other signs of hematological disorders. -- We will see the patient back in about 6 months. Always sooner if required. No orders of the defined types were placed in this encounter. Ms. Abran Waldron has a reminder for a \"due or due soon\" health maintenance. I have asked that she contact her primary care provider for follow-up on this health maintenance. All of patient's questions answered to their apparent satisfaction.  They verbally show understanding and agreement with aforementioned plan. Heather Jin MD  11/10/2022          Above mentioned total time spent for this encounter with more than 50% of the time spent in face-to-face counseling, discussing on diagnosis and management plan going forward, and co-ordination of care. Parts of this document has been produced using Dragon dictation system. Unrecognized errors in transcription may be present. Please do not hesitate to reach out for any questions or clarifications.         CC: Brianna Briggs MD

## 2022-12-04 DIAGNOSIS — E78.00 PURE HYPERCHOLESTEROLEMIA: ICD-10-CM

## 2022-12-06 RX ORDER — PRAVASTATIN SODIUM 10 MG/1
TABLET ORAL
Qty: 90 TABLET | Refills: 1 | Status: SHIPPED | OUTPATIENT
Start: 2022-12-06

## 2022-12-20 ENCOUNTER — OFFICE VISIT (OUTPATIENT)
Dept: FAMILY MEDICINE CLINIC | Age: 62
End: 2022-12-20
Payer: COMMERCIAL

## 2022-12-20 VITALS
HEART RATE: 91 BPM | RESPIRATION RATE: 14 BRPM | SYSTOLIC BLOOD PRESSURE: 152 MMHG | OXYGEN SATURATION: 97 % | HEIGHT: 67 IN | BODY MASS INDEX: 27.94 KG/M2 | WEIGHT: 178 LBS | DIASTOLIC BLOOD PRESSURE: 90 MMHG

## 2022-12-20 DIAGNOSIS — I10 ESSENTIAL HYPERTENSION: Primary | ICD-10-CM

## 2022-12-20 DIAGNOSIS — E78.00 PURE HYPERCHOLESTEROLEMIA: ICD-10-CM

## 2022-12-20 DIAGNOSIS — R53.83 FATIGUE, UNSPECIFIED TYPE: ICD-10-CM

## 2022-12-20 DIAGNOSIS — Z13.820 ENCOUNTER FOR OSTEOPOROSIS SCREENING IN ASYMPTOMATIC POSTMENOPAUSAL PATIENT: ICD-10-CM

## 2022-12-20 DIAGNOSIS — I10 ESSENTIAL HYPERTENSION: ICD-10-CM

## 2022-12-20 DIAGNOSIS — E55.9 VITAMIN D DEFICIENCY: ICD-10-CM

## 2022-12-20 DIAGNOSIS — D70.9 NEUTROPENIA, UNSPECIFIED TYPE (HCC): ICD-10-CM

## 2022-12-20 DIAGNOSIS — R07.9 CHEST PAIN, UNSPECIFIED TYPE: ICD-10-CM

## 2022-12-20 DIAGNOSIS — Z78.0 ENCOUNTER FOR OSTEOPOROSIS SCREENING IN ASYMPTOMATIC POSTMENOPAUSAL PATIENT: ICD-10-CM

## 2022-12-20 PROCEDURE — 3074F SYST BP LT 130 MM HG: CPT | Performed by: STUDENT IN AN ORGANIZED HEALTH CARE EDUCATION/TRAINING PROGRAM

## 2022-12-20 PROCEDURE — 99214 OFFICE O/P EST MOD 30 MIN: CPT | Performed by: STUDENT IN AN ORGANIZED HEALTH CARE EDUCATION/TRAINING PROGRAM

## 2022-12-20 PROCEDURE — 3078F DIAST BP <80 MM HG: CPT | Performed by: STUDENT IN AN ORGANIZED HEALTH CARE EDUCATION/TRAINING PROGRAM

## 2022-12-20 RX ORDER — PRAVASTATIN SODIUM 10 MG/1
10 TABLET ORAL
Qty: 90 TABLET | Refills: 1 | Status: SHIPPED | OUTPATIENT
Start: 2022-12-20

## 2022-12-20 RX ORDER — CYCLOSPORINE 0.5 MG/ML
EMULSION OPHTHALMIC
Status: CANCELLED | OUTPATIENT
Start: 2022-12-20

## 2022-12-20 NOTE — PATIENT INSTRUCTIONS
DASH Diet: Care Instructions  Your Care Instructions     The DASH diet is an eating plan that can help lower your blood pressure. DASH stands for Dietary Approaches to Stop Hypertension. Hypertension is high blood pressure. The DASH diet focuses on eating foods that are high in calcium, potassium, and magnesium. These nutrients can lower blood pressure. The foods that are highest in these nutrients are fruits, vegetables, low-fat dairy products, nuts, seeds, and legumes. But taking calcium, potassium, and magnesium supplements instead of eating foods that are high in those nutrients does not have the same effect. The DASH diet also includes whole grains, fish, and poultry. The DASH diet is one of several lifestyle changes your doctor may recommend to lower your high blood pressure. Your doctor may also want you to decrease the amount of sodium in your diet. Lowering sodium while following the DASH diet can lower blood pressure even further than just the DASH diet alone. Follow-up care is a key part of your treatment and safety. Be sure to make and go to all appointments, and call your doctor if you are having problems. It's also a good idea to know your test results and keep a list of the medicines you take. How can you care for yourself at home? Following the DASH diet  Eat 4 to 5 servings of fruit each day. A serving is 1 medium-sized piece of fruit, ½ cup chopped or canned fruit, 1/4 cup dried fruit, or 4 ounces (½ cup) of fruit juice. Choose fruit more often than fruit juice. Eat 4 to 5 servings of vegetables each day. A serving is 1 cup of lettuce or raw leafy vegetables, ½ cup of chopped or cooked vegetables, or 4 ounces (½ cup) of vegetable juice. Choose vegetables more often than vegetable juice. Get 2 to 3 servings of low-fat and fat-free dairy each day. A serving is 8 ounces of milk, 1 cup of yogurt, or 1 ½ ounces of cheese. Eat 6 to 8 servings of grains each day.  A serving is 1 slice of bread, 1 ounce of dry cereal, or ½ cup of cooked rice, pasta, or cooked cereal. Try to choose whole-grain products as much as possible. Limit lean meat, poultry, and fish to 2 servings each day. A serving is 3 ounces, about the size of a deck of cards. Eat 4 to 5 servings of nuts, seeds, and legumes (cooked dried beans, lentils, and split peas) each week. A serving is 1/3 cup of nuts, 2 tablespoons of seeds, or ½ cup of cooked beans or peas. Limit fats and oils to 2 to 3 servings each day. A serving is 1 teaspoon of vegetable oil or 2 tablespoons of salad dressing. Limit sweets and added sugars to 5 servings or less a week. A serving is 1 tablespoon jelly or jam, ½ cup sorbet, or 1 cup of lemonade. Eat less than 2,300 milligrams (mg) of sodium a day. If you limit your sodium to 1,500 mg a day, you can lower your blood pressure even more. Be aware that all of these are the suggested number of servings for people who eat 1,800 to 2,000 calories a day. Your recommended number of servings may be different if you need more or fewer calories. Tips for success  Start small. Do not try to make dramatic changes to your diet all at once. You might feel that you are missing out on your favorite foods and then be more likely to not follow the plan. Make small changes, and stick with them. Once those changes become habit, add a few more changes. Try some of the following:  Make it a goal to eat a fruit or vegetable at every meal and at snacks. This will make it easy to get the recommended amount of fruits and vegetables each day. Try yogurt topped with fruit and nuts for a snack or healthy dessert. Add lettuce, tomato, cucumber, and onion to sandwiches. Combine a ready-made pizza crust with low-fat mozzarella cheese and lots of vegetable toppings. Try using tomatoes, squash, spinach, broccoli, carrots, cauliflower, and onions.   Have a variety of cut-up vegetables with a low-fat dip as an appetizer instead of chips and dip.  Sprinkle sunflower seeds or chopped almonds over salads. Or try adding chopped walnuts or almonds to cooked vegetables. Try some vegetarian meals using beans and peas. Add garbanzo or kidney beans to salads. Make burritos and tacos with mashed hdez beans or black beans. Where can you learn more? Go to http://www.nix.com/  Enter H967 in the search box to learn more about \"DASH Diet: Care Instructions. \"  Current as of: January 10, 2022               Content Version: 13.4  © 0574-2479 [employer]. Care instructions adapted under license by [employer] (which disclaims liability or warranty for this information). If you have questions about a medical condition or this instruction, always ask your healthcare professional. [address] any warranty or liability for your use of this information.

## 2022-12-20 NOTE — PROGRESS NOTES
Griffin Santiago is a 58 y.o. female presenting today for Follow Up Chronic Condition (Bilateral ear check due to feeling moisture, would like hormone level evaluated and referrals placed for cardiac stress test, colonoscopy and DEXA. Has had Flu vaccine, will contact office with date. )  . Chief Complaint   Patient presents with    Follow Up Chronic Condition     Bilateral ear check due to feeling moisture, would like hormone level evaluated and referrals placed for cardiac stress test, colonoscopy and DEXA. Has had Flu vaccine, will contact office with date. HPI:  Griffin Santiago presents to the office today for follow up. Patient has a past medical history of HTN, HLD, OA.    HLD: Patient has a history of HLD with extensive family history of DM, HLD. She tried crestor, lipitor, and another statin but developed severe myalgias. She was switched to Zetia - with no side effects. She stopped taking it 2 yrs ago coz she felt like it wasn't doing anything. She has been exercising to stay healthy. 4 yrs ago she had intermittent chest pain while exercising - she underwent a stress test which was negative. Lipid panel in 2/22 showed , HDL 92, triglycerides 69. She was resumed on pravastatin with LDL improving to 163. Patient has been tolerating it well with no myalgias. Patient states she has history of elevated LFTs. Screening for hepatitis was negative at that time. She had a liver biopsy which was negative 25 yrs ago. No cause was identified. Recent LFTs are normal.    Pt underwent a hysterectomy in 2017 after atypical cells were noted on the cervix. Neutropenia: Now following with Dr. Josi Rice.    Chronic back pain: She used to go to a chiropracter in the past and was told she had OA. She has been taking ibuprofen intermittently which relieves the pain. Patient was referred to PT with improvement. X-ray showed moderately severe degenerative disc disease in lumbar spine.     Today, patient reports intermittent chest pain. Its occurs very briefly at center of her chest and lasts a few seconds. She reports fatigue as well. HTN: Noted to have consistently elevated BP. Today, BP significantly elevated. Healthcare Maintenance:  - Mammogram done on 12/09/21: BIRAD1/2. Screening again in 1 year. Mammogram has been scheduled. - Colonoscopy done a few years ago - polyp was removed. Was advised to repeat in 5 yrs. ROS  ROS:  History obtained from the patient intake forms which are reviewed with the patient  General: negative for - chills, fever, weight changes or malaise  HEENT: no sore throat, nasal congestion, vision problems or ear problems  Respiratory: no cough, shortness of breath, or wheezing  Cardiovascular: no palpitations, or dyspnea on exertion. Has chest pain. Gastrointestinal: no abdominal pain, N/V, change in bowel habits, or black or bloody stools  Musculoskeletal: non specific intermittent back pain and knee pain. Neurological: no numbness, tingling, headache or dizziness  Endo:  No polyuria or polydipsia. : no hematuria, dysuria, frequency, hesitancy, or nocturia.     Psychological: negative for - anxiety, depression, sleep disturbances, suicidal or homicidal ideations      Allergies   Allergen Reactions    Zocor [Simvastatin] Other (comments)     Body aches     Crestor [Rosuvastatin] Myalgia    Lipitor [Atorvastatin] Myalgia       PHQ Screening   3 most recent PHQ Screens 12/20/2022   Little interest or pleasure in doing things Not at all   Feeling down, depressed, irritable, or hopeless Not at all   Total Score PHQ 2 0       History  Past Medical History:   Diagnosis Date    Elevated LFTs     HLD (hyperlipidemia)        Past Surgical History:   Procedure Laterality Date    HX HYSTERECTOMY      HX TUBAL LIGATION         Social History     Socioeconomic History    Marital status:      Spouse name: Not on file    Number of children: Not on file    Years of education: Not on file    Highest education level: Not on file   Occupational History    Not on file   Tobacco Use    Smoking status: Never    Smokeless tobacco: Never   Vaping Use    Vaping Use: Never used   Substance and Sexual Activity    Alcohol use: Yes    Drug use: Never    Sexual activity: Not Currently   Other Topics Concern    Not on file   Social History Narrative    Not on file     Social Determinants of Health     Financial Resource Strain: Not on file   Food Insecurity: Not on file   Transportation Needs: Not on file   Physical Activity: Not on file   Stress: Not on file   Social Connections: Not on file   Intimate Partner Violence: Not on file   Housing Stability: Not on file             Vitals:    12/20/22 1021 12/20/22 1050   BP: (!) 149/92 (!) 152/90   Pulse: 91    Resp: 14    SpO2: 97%    Weight: 178 lb (80.7 kg)    Height: 5' 7\" (1.702 m)    PainSc:   0 - No pain        Physical Exam  Vitals and nursing note reviewed. Constitutional:       General: She is not in acute distress. Appearance: Normal appearance. She is normal weight. She is not ill-appearing, toxic-appearing or diaphoretic. HENT:      Head: Normocephalic and atraumatic. Nose: Nose normal. No congestion or rhinorrhea. Mouth/Throat:      Mouth: Mucous membranes are moist.      Pharynx: Oropharynx is clear. No oropharyngeal exudate or posterior oropharyngeal erythema. Eyes:      General: No scleral icterus. Right eye: No discharge. Left eye: No discharge. Extraocular Movements: Extraocular movements intact. Conjunctiva/sclera: Conjunctivae normal.      Pupils: Pupils are equal, round, and reactive to light. Cardiovascular:      Rate and Rhythm: Normal rate and regular rhythm. Pulses: Normal pulses. Heart sounds: Normal heart sounds. No murmur heard. Pulmonary:      Effort: Pulmonary effort is normal. No respiratory distress. Breath sounds: Normal breath sounds.  No wheezing or rales.   Abdominal:      General: Bowel sounds are normal. There is no distension. Palpations: Abdomen is soft. There is no mass. Tenderness: There is no abdominal tenderness. There is no guarding or rebound. Hernia: No hernia is present. Musculoskeletal:         General: No swelling or tenderness. Normal range of motion. Cervical back: Normal range of motion. Right lower leg: No edema. Left lower leg: No edema. Skin:     General: Skin is warm and dry. Coloration: Skin is not jaundiced or pale. Neurological:      General: No focal deficit present. Mental Status: She is alert and oriented to person, place, and time. Mental status is at baseline. Cranial Nerves: No cranial nerve deficit. Motor: No weakness. Gait: Gait normal.   Psychiatric:         Mood and Affect: Mood normal.         Behavior: Behavior normal.         Thought Content: Thought content normal.         Judgment: Judgment normal.       Abstract on 12/09/2022   Component Date Value Ref Range Status    OCCULT BLOOD FECAL 10/05/2022 Negative  Negative Final   Office Visit on 10/19/2022   Component Date Value Ref Range Status    C-Reactive Protein, Qt 10/19/2022 <0.5  <=0.5 mg/dL Final    HIV -1/0/2 AG/AB WITH REFLEX 10/19/2022 Non Reactive  Non Reacti Final    HIV INTERPRETATION 10/19/2022 HIV-1 antigen and HIV 1/2 antibodies not detected. No laboratory evidence of   Final    Comment: HIV infection. If acute HIV infection is suspected, consider testing for   HIV-1  RNA by PCR.         LD 10/19/2022 261 (A)  98 - 192 U/L Final    WBC 10/19/2022 2.9 (A)  4.0 - 11.0 K/uL Final    RBC 10/19/2022 4.61  3.80 - 5.20 M/uL Final    HGB 10/19/2022 12.6  11.7 - 16.0 g/dL Final    HCT 10/19/2022 41.0  35.1 - 48.0 % Final    MCV 10/19/2022 89  80 - 99 fL Final    MCH 10/19/2022 27  26 - 34 pg Final    MCHC 10/19/2022 31  31 - 36 g/dL Final    RDW 10/19/2022 13.6  10.0 - 15.5 % Final    PLATELET 69/44/5866 089 140 - 440 K/uL Final    MPV 10/19/2022 10.5  9.0 - 13.0 fL Final    NEUTROPHILS 10/19/2022 43  40 - 75 % Final    Lymphocytes 10/19/2022 39  20 - 45 % Final    MONOCYTES 10/19/2022 11  3 - 12 % Final    EOSINOPHILS 10/19/2022 6  0 - 6 % Final    BASOPHILS 10/19/2022 1  0 - 2 % Final    ABS. NEUTROPHILS 10/19/2022 1.2 (A)  1.8 - 7.7 K/uL Final    ABSOLUTE LYMPHOCYTE COUNT 10/19/2022 1.1  1.0 - 4.8 K/uL Final    ABS. MONOCYTES 10/19/2022 0.3  0.1 - 1.0 K/uL Final    ABS. EOSINOPHILS 10/19/2022 0.2  0.0 - 0.5 K/uL Final    ABS. BASOPHILS 10/19/2022 0.0  0.0 - 0.2 K/uL Final    Methylmalonic acid 10/19/2022 150  0 - 378 nmol/L Final    Comment: Test(s) 598774-Ukosfemsvyfwc Acid, Serum  was developed and its performance characteristics determined  by [facility]. It has not been cleared or approved by the Food  and Drug Administration. Performed at:  [address] [email or web address] [address]  [id number]  : Derick Marsh MD, Phone:  [phone]        Glucose 10/19/2022 45 (A)  70 - 99 mg/dL Final    BUN 10/19/2022 14  6 - 22 mg/dL Final    Creatinine 10/19/2022 0.7 (A)  0.8 - 1.4 mg/dL Final    Sodium 10/19/2022 142  133 - 145 mmol/L Final    Potassium 10/19/2022 4.2  3.5 - 5.5 mmol/L Final    Chloride 10/19/2022 101  98 - 110 mmol/L Final    CO2 10/19/2022 29  20 - 32 mmol/L Final    AST (SGOT) 10/19/2022 33  10 - 37 U/L Final    ALT (SGPT) 10/19/2022 22  5 - 40 U/L Final    Alk. phosphatase 10/19/2022 93  40 - 120 U/L Final    Bilirubin, total 10/19/2022 0.3  0.2 - 1.2 mg/dL Final    Calcium 10/19/2022 9.7  8.4 - 10.5 mg/dL Final    Protein, total 10/19/2022 7.2  6.2 - 8.1 g/dL Final    Albumin 10/19/2022 4.6  3.5 - 5.0 g/dL Final    A-G Ratio 10/19/2022 1.8  1.1 - 2.6 ratio Final    Globulin 10/19/2022 2.6  2.0 - 4.0 g/dL Final    GLOMERULAR FILTRATION RATE 10/19/2022 >60.0  >60.0 mL/min/1.73 sq.m.  Final    Comment: eGFR calculation based on the Chronic Kidney Disease Epidemiology Collaboration  (CKD-EPI) equation refit without adjustment for race. This eGFR is validated for stable chronic renal failure patients. This   equation  is unreliable in acute illness or patients with normal renal function. Anion gap 10/19/2022 12.0  3.0 - 15.0 mmol/L Final    Comment: Anion Gap calculation based on electrolyte reference ranges. Test includes Albumin, Alkaline Phosphatase, ALT, AST, BUN, Calcium, CO2,  Chloride, Creatinine, Glucose, Potassium, Sodium, Total Bilirubin and Total  Protein. Folate 10/19/2022 >20.00  >=3.10 ng/mL Final    Comment: Borderline deficient  2.2-3.0 ng/ml  Deficient             <2.2 ng/ml          Vitamin B12 10/19/2022 865  211 - 911 pg/mL Final    EDMUND SCREEN 10/19/2022 Negative  Negative Final    Protein, total 10/19/2022 7.0  6.2 - 8.1 g/dL Final    Albumin 10/19/2022 53.5  46.6 - 62.6 % Final    Alpha-1-globulin 10/19/2022 3.6  1.7 - 4.1 % Final    ALPHA-2 GLOBULIN 10/19/2022 11.1  5.9 - 13.5 % Final    Beta globulin 10/19/2022 15.3  10.9 - 18.9 % Final    Gamma globulin 10/19/2022 16.5  11.6 - 24.4 % Final    Immunofixation, serum 10/19/2022 See comment. Final    Comment: SEE SPEP COMMENT. Electronic Signature: Mio Osorio. Cortes Watkins MD  CPT: [id number]    Test includes typing of monoclonal proteins for heavy and light chains and  protein electrophoresis. Protein, total 10/19/2022 7.0  6.2 - 8.1 g/dL Final    Albumin 10/19/2022 53.5  46.6 - 62.6 % Final    Alpha-1-globulin 10/19/2022 3.6  1.7 - 4.1 % Final    ALPHA-2 GLOBULIN 10/19/2022 11.1  5.9 - 13.5 % Final    Beta globulin 10/19/2022 15.3  10.9 - 18.9 % Final    Gamma globulin 10/19/2022 16.5  11.6 - 24.4 % Final    PE Interpretattion 10/19/2022 see comments   Final    Comment: The SPEP is within normal limits. No suspicious (monoclonal-looking) bands are  seen. Serum immunofixation fails to show monoclonal IgG, IgA, IgM, kappa light  chains, or lambda light chains.   Electronic Signature: Noemi Almodovar MD  CPT: [id number]          PATH REVIEW OF SMEAR 10/19/2022 See Comment   Final    Comment: Neutropenia, which may be due to drug effect, autoimmune/other immune  disorders, neoplasms replacing bone marrow, megaloblastic anemia,  constitutional neutropenic disorders, bone marrow ablative therapies,  infection, acquired idiopathic neutropenia or hypersplenism. Correlate  clinically. Electronic Signature: Juan Miguelsamantha Ramirez. America Dos Santos M.D. ([phone])  CPT: [id number]             No results found for any visits on 12/20/22. Patient Care Team:  Patient Care Team:  Sherley Weiss MD as PCP - General (Internal Medicine Physician)  Sherley Weiss MD as PCP - [address] [place] [address] Dr Wallace Provider      Assessment / Plan:      ICD-10-CM ICD-9-CM    1. Essential hypertension  I10 401.9 CBC WITH AUTOMATED DIFF      TSH W/ REFLX FREE T4 IF ABNORMAL      METABOLIC PANEL, COMPREHENSIVE      2. Pure hypercholesterolemia  E78.00 272.0 pravastatin (PRAVACHOL) 10 mg tablet      LIPID PANEL      3. Encounter for osteoporosis screening in asymptomatic postmenopausal patient  Z13.820 V82.81 DEXA BONE DENSITY STUDY AXIAL    Z78.0 V49.81       4. Neutropenia, unspecified type ([address]  D70.9 288.00       5. Vitamin D deficiency  E55.9 268.9 VITAMIN D, 25 HYDROXY      6. Chest pain, unspecified type  R07.9 786.50 NUCLEAR CARDIAC STRESS TEST      7. Fatigue, unspecified type  R53.83 780.79              HLD: Continue pravastatin. Tolerating it without significant myalgias. Check repeat lipid panel. HTN: BP significantly elevated today. Prior BP has also been elevated in SBP 130s. Patient states she will work on lifestyle modification but if BP is still elevated at next visit-will start on an antihypertensive agent. Check TSH. Chest pain: Given patient's risk factors, will order a nuclear stress test.    Hip pain and back pain: Stable. Vitamin D deficiency: Check repeat vitamin D level. Neutropenia:  Recheck CBC.     Referred to GI for colonoscopy - pending appt. Healthcare Maintenance:  - Pap smear: not indicated due to hysterectomy  - She is scheduled for the mammogram.  - DEXA ordered. - Already received the flu shot. Labs prior to next visit. Follow-up and Dispositions    Return in about 4 months (around 4/20/2023) for BP check. I asked the patient if she  had any questions and answered her  questions.   The patient stated that she understands the treatment plan and agrees with the treatment plan

## 2022-12-23 ENCOUNTER — TELEPHONE (OUTPATIENT)
Dept: FAMILY MEDICINE CLINIC | Age: 62
End: 2022-12-23

## 2022-12-23 DIAGNOSIS — H66.90 ACUTE OTITIS MEDIA, UNSPECIFIED OTITIS MEDIA TYPE: Primary | ICD-10-CM

## 2022-12-23 RX ORDER — AMOXICILLIN AND CLAVULANATE POTASSIUM 875; 125 MG/1; MG/1
1 TABLET, FILM COATED ORAL EVERY 12 HOURS
Qty: 10 TABLET | Refills: 0 | Status: SHIPPED | OUTPATIENT
Start: 2022-12-23 | End: 2022-12-28

## 2022-12-23 NOTE — TELEPHONE ENCOUNTER
Today, Patient will reports worsening left ear pain. She was seen at the visit on 12/20/2022-ears were examined. She did report slight pain at that time. Will prescribe antibiotic. If no improvement advised to go to ED.

## 2023-01-03 ENCOUNTER — TELEPHONE (OUTPATIENT)
Dept: FAMILY MEDICINE CLINIC | Age: 63
End: 2023-01-03

## 2023-01-03 DIAGNOSIS — H91.92 DECREASED HEARING OF LEFT EAR: Primary | ICD-10-CM

## 2023-01-03 NOTE — TELEPHONE ENCOUNTER
Patient called requesting referral to ENT. Patient was seen on 12/20/22 and states her ear was examined \"too deep\" and she is experiencing complete hearing loss in left hear. Please advise and follow up.

## 2023-01-06 NOTE — TELEPHONE ENCOUNTER
Patient VM set up. Message left with VM that ENT referral had been placed. Advised to allow at least a week to be contacted by ENT for appointment. Advised to contact PCP office if any other concerns.

## 2023-01-12 ENCOUNTER — HOSPITAL ENCOUNTER (OUTPATIENT)
Dept: BONE DENSITY | Age: 63
Discharge: HOME | End: 2023-01-12
Attending: STUDENT IN AN ORGANIZED HEALTH CARE EDUCATION/TRAINING PROGRAM
Payer: COMMERCIAL

## 2023-01-12 DIAGNOSIS — Z13.820 ENCOUNTER FOR OSTEOPOROSIS SCREENING IN ASYMPTOMATIC POSTMENOPAUSAL PATIENT: ICD-10-CM

## 2023-01-12 DIAGNOSIS — Z78.0 ENCOUNTER FOR OSTEOPOROSIS SCREENING IN ASYMPTOMATIC POSTMENOPAUSAL PATIENT: ICD-10-CM

## 2023-01-12 PROCEDURE — 77080 DXA BONE DENSITY AXIAL: CPT

## 2023-02-02 ENCOUNTER — HOSPITAL ENCOUNTER (OUTPATIENT)
Dept: NON INVASIVE DIAGNOSTICS | Age: 63
End: 2023-02-02
Attending: STUDENT IN AN ORGANIZED HEALTH CARE EDUCATION/TRAINING PROGRAM
Payer: COMMERCIAL

## 2023-02-02 ENCOUNTER — HOSPITAL ENCOUNTER (OUTPATIENT)
Dept: NUCLEAR MEDICINE | Age: 63
Discharge: HOME | End: 2023-02-02
Attending: STUDENT IN AN ORGANIZED HEALTH CARE EDUCATION/TRAINING PROGRAM
Payer: COMMERCIAL

## 2023-02-02 VITALS
BODY MASS INDEX: 27.47 KG/M2 | DIASTOLIC BLOOD PRESSURE: 80 MMHG | SYSTOLIC BLOOD PRESSURE: 122 MMHG | WEIGHT: 175 LBS | HEIGHT: 67 IN

## 2023-02-02 DIAGNOSIS — R07.9 CHEST PAIN, UNSPECIFIED TYPE: ICD-10-CM

## 2023-02-02 LAB
NUC STRESS EJECTION FRACTION: 64 %
STRESS ANGINA INDEX: 0
STRESS BASELINE DIAS BP: 88 MMHG
STRESS BASELINE HR: 83 BPM
STRESS BASELINE SYS BP: 122 MMHG
STRESS ESTIMATED WORKLOAD: 7 METS
STRESS EXERCISE DUR MIN: 6 MIN
STRESS EXERCISE DUR SEC: 0 SEC
STRESS PEAK DIAS BP: 86 MMHG
STRESS PEAK SYS BP: 138 MMHG
STRESS PERCENT HR ACHIEVED: 97 %
STRESS POST PEAK HR: 153 BPM
STRESS RATE PRESSURE PRODUCT: NORMAL BPM*MMHG
STRESS TARGET HR: 158 BPM
TID: 0.94

## 2023-02-02 PROCEDURE — 93017 CV STRESS TEST TRACING ONLY: CPT

## 2023-02-02 PROCEDURE — 74011250636 HC RX REV CODE- 250/636: Performed by: STUDENT IN AN ORGANIZED HEALTH CARE EDUCATION/TRAINING PROGRAM

## 2023-02-02 PROCEDURE — A9502 TC99M TETROFOSMIN: HCPCS

## 2023-02-02 RX ORDER — SODIUM CHLORIDE 9 MG/ML
250 INJECTION, SOLUTION INTRAVENOUS ONCE
Status: COMPLETED | OUTPATIENT
Start: 2023-02-02 | End: 2023-02-02

## 2023-02-02 RX ADMIN — SODIUM CHLORIDE 250 ML: 900 INJECTION, SOLUTION INTRAVENOUS at 09:50

## 2023-02-03 ENCOUNTER — TELEPHONE (OUTPATIENT)
Dept: FAMILY MEDICINE CLINIC | Age: 63
End: 2023-02-03

## 2023-02-03 NOTE — TELEPHONE ENCOUNTER
Spoke to patient and advised of results for DEXA and cardiac stress. She had no other concerns and understood results.

## 2023-02-03 NOTE — TELEPHONE ENCOUNTER
----- Message from Ora Garcia MD sent at 2/2/2023  2:17 PM EST -----  Please inform patient that her nuclear cardiac stress test was normal

## 2023-04-07 ENCOUNTER — TELEPHONE (OUTPATIENT)
Facility: CLINIC | Age: 63
End: 2023-04-07

## 2023-04-07 DIAGNOSIS — I10 ESSENTIAL (PRIMARY) HYPERTENSION: Primary | ICD-10-CM

## 2023-04-07 DIAGNOSIS — E78.00 PURE HYPERCHOLESTEROLEMIA, UNSPECIFIED: ICD-10-CM

## 2023-04-07 DIAGNOSIS — E55.9 VITAMIN D DEFICIENCY, UNSPECIFIED: ICD-10-CM

## 2023-04-07 DIAGNOSIS — R53.83 OTHER FATIGUE: ICD-10-CM

## 2023-04-18 ENCOUNTER — OFFICE VISIT (OUTPATIENT)
Facility: CLINIC | Age: 63
End: 2023-04-18
Payer: COMMERCIAL

## 2023-04-18 VITALS
HEART RATE: 83 BPM | HEIGHT: 67 IN | SYSTOLIC BLOOD PRESSURE: 138 MMHG | RESPIRATION RATE: 15 BRPM | BODY MASS INDEX: 27.72 KG/M2 | OXYGEN SATURATION: 100 % | DIASTOLIC BLOOD PRESSURE: 80 MMHG | WEIGHT: 176.6 LBS

## 2023-04-18 DIAGNOSIS — I10 ESSENTIAL (PRIMARY) HYPERTENSION: Primary | ICD-10-CM

## 2023-04-18 DIAGNOSIS — E55.9 VITAMIN D DEFICIENCY, UNSPECIFIED: ICD-10-CM

## 2023-04-18 DIAGNOSIS — E78.00 PURE HYPERCHOLESTEROLEMIA, UNSPECIFIED: ICD-10-CM

## 2023-04-18 PROCEDURE — 3075F SYST BP GE 130 - 139MM HG: CPT | Performed by: STUDENT IN AN ORGANIZED HEALTH CARE EDUCATION/TRAINING PROGRAM

## 2023-04-18 PROCEDURE — 3079F DIAST BP 80-89 MM HG: CPT | Performed by: STUDENT IN AN ORGANIZED HEALTH CARE EDUCATION/TRAINING PROGRAM

## 2023-04-18 PROCEDURE — 99214 OFFICE O/P EST MOD 30 MIN: CPT | Performed by: STUDENT IN AN ORGANIZED HEALTH CARE EDUCATION/TRAINING PROGRAM

## 2023-04-18 RX ORDER — PRAVASTATIN SODIUM 10 MG
10 TABLET ORAL DAILY
Qty: 90 TABLET | Refills: 1 | Status: SHIPPED | OUTPATIENT
Start: 2023-04-18

## 2023-04-18 SDOH — ECONOMIC STABILITY: HOUSING INSECURITY
IN THE LAST 12 MONTHS, WAS THERE A TIME WHEN YOU DID NOT HAVE A STEADY PLACE TO SLEEP OR SLEPT IN A SHELTER (INCLUDING NOW)?: NO

## 2023-04-18 SDOH — ECONOMIC STABILITY: FOOD INSECURITY: WITHIN THE PAST 12 MONTHS, THE FOOD YOU BOUGHT JUST DIDN'T LAST AND YOU DIDN'T HAVE MONEY TO GET MORE.: NEVER TRUE

## 2023-04-18 SDOH — ECONOMIC STABILITY: FOOD INSECURITY: WITHIN THE PAST 12 MONTHS, YOU WORRIED THAT YOUR FOOD WOULD RUN OUT BEFORE YOU GOT MONEY TO BUY MORE.: NEVER TRUE

## 2023-04-18 SDOH — ECONOMIC STABILITY: INCOME INSECURITY: HOW HARD IS IT FOR YOU TO PAY FOR THE VERY BASICS LIKE FOOD, HOUSING, MEDICAL CARE, AND HEATING?: SOMEWHAT HARD

## 2023-04-18 ASSESSMENT — PATIENT HEALTH QUESTIONNAIRE - PHQ9
SUM OF ALL RESPONSES TO PHQ QUESTIONS 1-9: 0
2. FEELING DOWN, DEPRESSED OR HOPELESS: 0
SUM OF ALL RESPONSES TO PHQ QUESTIONS 1-9: 0
SUM OF ALL RESPONSES TO PHQ QUESTIONS 1-9: 0
1. LITTLE INTEREST OR PLEASURE IN DOING THINGS: 0
SUM OF ALL RESPONSES TO PHQ9 QUESTIONS 1 & 2: 0
SUM OF ALL RESPONSES TO PHQ QUESTIONS 1-9: 0

## 2023-04-18 ASSESSMENT — ENCOUNTER SYMPTOMS
WHEEZING: 0
COUGH: 0
SHORTNESS OF BREATH: 0
RHINORRHEA: 0
CHEST TIGHTNESS: 0
BACK PAIN: 0
BLOOD IN STOOL: 0
VOMITING: 0
DIARRHEA: 0
ABDOMINAL PAIN: 0
NAUSEA: 0

## 2023-04-18 NOTE — PROGRESS NOTES
plan    This document was created with a voice activated dictation system and may contain transcription errors.

## 2023-04-25 DIAGNOSIS — D72.819 LEUKOPENIA, UNSPECIFIED TYPE: Primary | ICD-10-CM

## 2023-04-25 DIAGNOSIS — D70.9 NEUTROPENIA, UNSPECIFIED TYPE (HCC): ICD-10-CM

## 2023-05-09 ENCOUNTER — HOSPITAL ENCOUNTER (OUTPATIENT)
Facility: HOSPITAL | Age: 63
Discharge: HOME | End: 2023-05-12

## 2023-05-09 LAB
A/G RATIO: 1.9 RATIO (ref 1.1–2.6)
ALBUMIN SERPL-MCNC: 4 G/DL (ref 3.5–5)
ALP BLD-CCNC: 95 U/L (ref 40–120)
ALT SERPL-CCNC: 16 U/L (ref 5–40)
ANION GAP SERPL CALCULATED.3IONS-SCNC: 9 MMOL/L (ref 3–15)
AST SERPL-CCNC: 26 U/L (ref 10–37)
BASOPHILS # BLD: 1 % (ref 0–2)
BASOPHILS ABSOLUTE: 0 K/UL (ref 0–0.2)
BILIRUB SERPL-MCNC: 0.4 MG/DL (ref 0.2–1.2)
BUN BLDV-MCNC: 11 MG/DL (ref 6–22)
CALCIUM SERPL-MCNC: 9.7 MG/DL (ref 8.4–10.5)
CHLORIDE BLD-SCNC: 106 MMOL/L (ref 98–110)
CO2: 26 MMOL/L (ref 20–32)
CREAT SERPL-MCNC: 0.7 MG/DL (ref 0.8–1.4)
EOSINOPHIL # BLD: 6 % (ref 0–6)
EOSINOPHILS ABSOLUTE: 0.2 K/UL (ref 0–0.5)
GLOBULIN: 2.1 G/DL (ref 2–4)
GLOMERULAR FILTRATION RATE: >60 ML/MIN/1.73 SQ.M.
GLUCOSE: 82 MG/DL (ref 70–99)
HCT VFR BLD CALC: 39.3 % (ref 35.1–48)
HEMOGLOBIN: 12.7 G/DL (ref 11.7–16)
LYMPHOCYTES # BLD: 41 % (ref 20–45)
LYMPHOCYTES ABSOLUTE: 1.1 K/UL (ref 1–4.8)
MCH RBC QN AUTO: 28 PG (ref 26–34)
MCHC RBC AUTO-ENTMCNC: 32 G/DL (ref 31–36)
MCV RBC AUTO: 87 FL (ref 80–99)
MONOCYTES ABSOLUTE: 0.3 K/UL (ref 0.1–1)
MONOCYTES: 10 % (ref 3–12)
NEUTROPHILS ABSOLUTE: 1.1 K/UL (ref 1.8–7.7)
NEUTROPHILS: 42 % (ref 40–75)
PDW BLD-RTO: 13.2 % (ref 10–15.5)
PLATELET # BLD: 202 K/UL (ref 140–440)
PMV BLD AUTO: 10.7 FL (ref 9–13)
POTASSIUM SERPL-SCNC: 4.4 MMOL/L (ref 3.5–5.5)
RBC: 4.51 M/UL (ref 3.8–5.2)
SENTARA SPECIMEN COLLECTION: NORMAL
SODIUM BLD-SCNC: 141 MMOL/L (ref 133–145)
TOTAL PROTEIN: 6.1 G/DL (ref 6.2–8.1)
WBC: 2.7 K/UL (ref 4–11)

## 2023-05-09 PROCEDURE — 99001 SPECIMEN HANDLING PT-LAB: CPT

## 2023-05-11 ENCOUNTER — OFFICE VISIT (OUTPATIENT)
Age: 63
End: 2023-05-11
Payer: COMMERCIAL

## 2023-05-11 VITALS
SYSTOLIC BLOOD PRESSURE: 130 MMHG | TEMPERATURE: 98 F | WEIGHT: 175.6 LBS | OXYGEN SATURATION: 100 % | DIASTOLIC BLOOD PRESSURE: 83 MMHG | BODY MASS INDEX: 27.56 KG/M2 | HEART RATE: 82 BPM | HEIGHT: 67 IN

## 2023-05-11 DIAGNOSIS — D70.9 NEUTROPENIA, UNSPECIFIED TYPE (HCC): Primary | ICD-10-CM

## 2023-05-11 PROCEDURE — 99213 OFFICE O/P EST LOW 20 MIN: CPT | Performed by: NURSE PRACTITIONER

## 2023-05-11 PROCEDURE — 3079F DIAST BP 80-89 MM HG: CPT | Performed by: NURSE PRACTITIONER

## 2023-05-11 PROCEDURE — 3075F SYST BP GE 130 - 139MM HG: CPT | Performed by: NURSE PRACTITIONER

## 2023-05-11 ASSESSMENT — ENCOUNTER SYMPTOMS
ANAL BLEEDING: 0
NAUSEA: 0
SHORTNESS OF BREATH: 0
CONSTIPATION: 0
DIARRHEA: 0
BLOOD IN STOOL: 0
CHOKING: 0
COLOR CHANGE: 0
COUGH: 0
CHEST TIGHTNESS: 0
ABDOMINAL PAIN: 0
VOMITING: 0

## 2023-05-11 ASSESSMENT — PATIENT HEALTH QUESTIONNAIRE - PHQ9
1. LITTLE INTEREST OR PLEASURE IN DOING THINGS: 0
SUM OF ALL RESPONSES TO PHQ QUESTIONS 1-9: 0
SUM OF ALL RESPONSES TO PHQ QUESTIONS 1-9: 0
SUM OF ALL RESPONSES TO PHQ9 QUESTIONS 1 & 2: 0
2. FEELING DOWN, DEPRESSED OR HOPELESS: 0
SUM OF ALL RESPONSES TO PHQ QUESTIONS 1-9: 0
SUM OF ALL RESPONSES TO PHQ QUESTIONS 1-9: 0

## 2023-05-11 NOTE — PROGRESS NOTES
continue to monitor    Sarath Saini has a reminder for a \"due or due soon\" health maintenance. I have asked patient to contact his primary care provider for follow-up on this health maintenance. All of patient's questions were answered to her apparent satisfaction. She verbally showed understanding and agreement with aforementioned plan. Follow up in 6 months with repeat labs or sooner if indicated.     Orders Placed This Encounter   Procedures    CBC with Auto Differential     Standing Status:   Future     Standing Expiration Date:   5/11/2024    Comprehensive Metabolic Panel     Standing Status:   Future     Standing Expiration Date:   5/11/2024         MIGUEL Thayer, DNP  5/11/23      CC: Therese Correa MD

## 2023-05-19 ENCOUNTER — TELEPHONE (OUTPATIENT)
Facility: CLINIC | Age: 63
End: 2023-05-19

## 2023-05-19 DIAGNOSIS — M15.9 PRIMARY OSTEOARTHRITIS INVOLVING MULTIPLE JOINTS: Primary | ICD-10-CM

## 2023-05-19 DIAGNOSIS — L98.9 SKIN LESION: ICD-10-CM

## 2023-05-19 NOTE — TELEPHONE ENCOUNTER
Patient called and asked for her to be referred to someone for her knee and ankle issues and also another referral for a female dermatologist. Patients callback number is [phone]. Please advise.

## 2023-06-01 ENCOUNTER — OFFICE VISIT (OUTPATIENT)
Age: 63
End: 2023-06-01

## 2023-06-01 VITALS — WEIGHT: 174 LBS | HEIGHT: 67 IN | TEMPERATURE: 97.1 F | BODY MASS INDEX: 27.31 KG/M2

## 2023-06-01 DIAGNOSIS — M25.561 CHRONIC PAIN OF RIGHT KNEE: Primary | ICD-10-CM

## 2023-06-01 DIAGNOSIS — M17.12 PRIMARY OSTEOARTHRITIS OF LEFT KNEE: ICD-10-CM

## 2023-06-01 DIAGNOSIS — G89.29 CHRONIC PAIN OF RIGHT KNEE: Primary | ICD-10-CM

## 2023-06-01 DIAGNOSIS — M17.11 PRIMARY OSTEOARTHRITIS OF RIGHT KNEE: ICD-10-CM

## 2023-06-01 DIAGNOSIS — M76.62 ACHILLES TENDINITIS OF LEFT LOWER EXTREMITY: ICD-10-CM

## 2023-07-07 ENCOUNTER — TELEPHONE (OUTPATIENT)
Facility: CLINIC | Age: 63
End: 2023-07-07

## 2023-08-18 ENCOUNTER — TELEPHONE (OUTPATIENT)
Age: 63
End: 2023-08-18

## 2023-08-18 NOTE — TELEPHONE ENCOUNTER
Patient is requesting a disc with images from x-ray from her 6/1 OV. Will  at [facility] when ready.      Patient can be reached at: [phone]

## 2023-09-14 ENCOUNTER — HOSPITAL ENCOUNTER (OUTPATIENT)
Facility: HOSPITAL | Age: 63
Setting detail: RECURRING SERIES
Discharge: HOME | End: 2023-09-17
Payer: COMMERCIAL

## 2023-09-14 PROCEDURE — 97162 PT EVAL MOD COMPLEX 30 MIN: CPT

## 2023-09-14 NOTE — PROGRESS NOTES
[facility]  [facility], [address] [place] [address]  EW:[phone] IM:[phone]  Plan of Care / Statement of Necessity for Physical Therapy Services     Patient Name: Olivia Cabrera : 1960   Medical   Diagnosis: Pain in right knee [M25.561] Treatment Diagnosis: M25.561  RIGHT KNEE PAIN     Onset Date: 23     Referral Source: HUONG Ramirez Start of Care [facility]): 2023   Prior Hospitalization: See medical history Provider #: [id number]   Prior Level of Function: Independent with ADLs  and community activities , walking , stair climbing with no Pain   Comorbidities: High Cholestrol ,   Per patient report , Pt had prior surgeries     Assessment / key information:  Olivia Cabrera is a 58 y.o.  yo female with Dx: Tear of Medial  Meniscus  R knee , who reports  R knee Pain  . Patient reports increase  in pain R knee after  fall on 2023 . Pt rates pain as 10/10 max,  3/10 at best, 4/10 today, increasing with  ADLS ,  sitting , walking, community activities . R knee pain increases with R knee flex/ ext  . Objective: FOTO score =(55 (an established functional score where 100 = no disability). AROM:  R knee Flex  118  , R knee Ext  5 degree. MMT: R  LE strength 3+/5 ( R knee flex/ Ext ., HIP flex ). Pain , Tenderness and swelling on  Anterior aspect of R knee on Palpation. Pt instructed in HEP and will f/u in clinic for PT. Evaluation Complexity:  History:  MEDIUM  Complexity : 1-2 comorbidities / personal factors will impact the outcome/ POC ; Examination:  MEDIUM Complexity : 3 Standardized tests and measures addressin body structure, function, activity limitation and / or participation in recreation  ;Presentation:  MEDIUM Complexity : Evolving with changing characteristics  ; Clinical Decision Making:  MEDIUM Complexity : FOTO score of 26-74 FOTO score = an established functional score where 100 = no disability  Overall

## 2023-09-14 NOTE — PROGRESS NOTES
MS:    SB- SR 57-70    I/O Junctional, F PAC    [birth year]     PHYSICAL / OCCUPATIONAL THERAPY - DAILY TREATMENT NOTE (updated )  For Eval visit    Patient Name: Ronna Cooley    Date: 2023    : 1960  Insurance: Payor: Raul Montalvo / Plan: [employer] HMO / Product Type: *No Product type* /      Patient  verified Yes   Visit #   Current / Total 1 16   Time   In / Out 942 1038   Pain   In / Out 4 4   Subjective Functional Status/Changes: See POC     TREATMENT AREA =  see POC    OBJECTIVE        56 min   Eval - untimed             HEP established and given to patient          Therapeutic Procedures: Tx Min Billable or 1:1 Min (if diff from [employer]) Procedure, Rationale, Specifics          Details if applicable:              Details if applicable:            Details if applicable:            Details if applicable:            Details if applicable:       [facility] Totals Reminder: bill using total billable min of TIMED therapeutic procedures (example: do not include dry needle or estim unattended, both untimed codes, in totals to left)  8-22 min = 1 unit; 23-37 min = 2 units; 38-52 min = 3 units; 53-67 min = 4 units; 68-82 min = 5 units   Total Total     [x]  Patient Education billed concurrently with other procedures   [x] Review HEP    [] Progressed/Changed HEP, detail:    [] Other detail:       Objective Information/Functional Measures/Assessment    See POC    Patient will continue to benefit from skilled PT / OT services to modify and progress therapeutic interventions, analyze and address functional mobility deficits, analyze and address ROM deficits, analyze and address strength deficits, analyze and address soft tissue restrictions, analyze and cue for proper movement patterns, analyze and modify for postural abnormalities, and analyze and address imbalance/dizziness to address functional deficits and attain remaining goals.     Progress toward goals / Updated goals:  [x]  See POC      PLAN  yes Continue plan of care  []  Other:      William Pierce, PT    2023

## 2023-10-03 ENCOUNTER — HOSPITAL ENCOUNTER (OUTPATIENT)
Facility: HOSPITAL | Age: 63
Setting detail: RECURRING SERIES
Discharge: HOME | End: 2023-10-06
Payer: COMMERCIAL

## 2023-10-03 PROCEDURE — 97530 THERAPEUTIC ACTIVITIES: CPT

## 2023-10-03 PROCEDURE — 97110 THERAPEUTIC EXERCISES: CPT

## 2023-10-03 PROCEDURE — 97016 VASOPNEUMATIC DEVICE THERAPY: CPT

## 2023-10-03 PROCEDURE — 97112 NEUROMUSCULAR REEDUCATION: CPT

## 2023-10-03 NOTE — PROGRESS NOTES
deficits and attain remaining goals. Progress toward goals / Updated goals:  []  See Progress Note/Recertification    Short Term Goals: To be accomplished in  3-4  weeks:  1. Independent with HEP. EVAL: HEP established and given to patient   2. Decrease  R knee pain to 2/10  to assist with ADLS and walking   EVAL: 4/10      Long Term Goals: To be accomplished in  6-8  weeks:  1. Decrease  R knee pain to 0/10  to assist with ADLS and walking   EVAL: 4/10      2. Improve FOTO Functional Status Score by  12 points in order to show significant functional improvement. EVAL: 55  3.    To improve R knee Flex/ Ext , R hip Flex to 4+/5  to 5/5 in order to assist with ADLS and walking   EVAL:  3+/5     Next PN/ RC due 10/14/23  Auth due 11/14/23    PLAN  - Continue Plan of [facility] Radha Dow, PT    10/3/2023    7:17 AM    Future Appointments   Date Time Provider [address]   10/3/2023  8:10 AM Giovanni Gracia, PT [facility]   10/5/2023  8:50 AM Adrian Sample, PT [facility]   10/9/2023  9:30 AM [facility] AM[facility]   10/11/2023  8:50 AM Giovanni Gracia, PT [facility]   10/13/2023  8:50 AM Adrian Sample, PT [facility]   10/16/2023  8:50 AM Ryland Henriquez, PTA [facility]   10/17/2023  9:40 AM MD DOMINGA Coon BS AMB   10/18/2023  8:50 AM Ryland Henriquez, PTA [facility]   10/24/2023  8:50 AM Serenity Ruffin PTA [facility]   10/26/2023  8:10 AM Jasper Runner, PT [facility] [address] [place] [phone]

## 2023-10-05 ENCOUNTER — HOSPITAL ENCOUNTER (OUTPATIENT)
Facility: HOSPITAL | Age: 63
Setting detail: RECURRING SERIES
Discharge: HOME | End: 2023-10-08
Payer: COMMERCIAL

## 2023-10-05 PROCEDURE — 97110 THERAPEUTIC EXERCISES: CPT

## 2023-10-05 PROCEDURE — 97530 THERAPEUTIC ACTIVITIES: CPT

## 2023-10-05 NOTE — PROGRESS NOTES
PHYSICAL / OCCUPATIONAL THERAPY - DAILY TREATMENT NOTE (updated )    Patient Name: Martha Mckeon    Date: 10/5/2023    : 1960  Insurance: Payor: Mellisa Medina / Plan: OPTIMA HMO / Product Type: *No Product type* /      Patient  verified Yes     Visit #   Current / Total 3 16   Time   In / Out 901 939   Pain   In / Out 6 3   Subjective Functional Status/Changes: Patient complains of R knee Pain      TREATMENT AREA =  Pain in right knee [M25.561]    OBJECTIVE      Therapeutic Procedures: Tx Min Billable or 1:1 Min (if diff from Tx Min) Procedure, Rationale, Specifics   25  59524 Therapeutic Exercise (timed):  increase ROM, strength, coordination, balance, and proprioception to improve patient's ability to progress to PLOF and address remaining functional goals. (see flow sheet as applicable)     Details if applicable:       13  52530 Therapeutic Activity (timed):  use of dynamic activities replicating functional movements to increase ROM, strength, coordination, balance, and proprioception in order to improve patient's ability to progress to PLOF and address remaining functional goals. (see flow sheet as applicable)     Details if applicable:            Details if applicable:            Details if applicable:            Details if applicable:     45  [facility] Totals Reminder: bill using total billable min of TIMED therapeutic procedures (example: do not include dry needle or estim unattended, both untimed codes, in totals to left)  8-22 min = 1 unit; 23-37 min = 2 units; 38-52 min = 3 units; 53-67 min = 4 units; 68-82 min = 5 units   Total Total     [x]  Patient Education billed concurrently with other procedures   [x] Review HEP    [] Progressed/Changed HEP, detail:    [] Other detail:       Objective Information/Functional Measures/Assessment  Patient reports that she is doing her HEP  4 times per week. Patient educated about doing HEP everyday inorder to get full Benefits of PT services . Patient needs

## 2023-10-11 ENCOUNTER — HOSPITAL ENCOUNTER (OUTPATIENT)
Facility: HOSPITAL | Age: 63
Setting detail: RECURRING SERIES
Discharge: HOME | End: 2023-10-14
Payer: COMMERCIAL

## 2023-10-11 ENCOUNTER — OFFICE VISIT (OUTPATIENT)
Facility: CLINIC | Age: 63
End: 2023-10-11
Payer: COMMERCIAL

## 2023-10-11 VITALS
DIASTOLIC BLOOD PRESSURE: 88 MMHG | TEMPERATURE: 96.9 F | SYSTOLIC BLOOD PRESSURE: 132 MMHG | HEIGHT: 67 IN | BODY MASS INDEX: 28.41 KG/M2 | WEIGHT: 181 LBS | OXYGEN SATURATION: 94 % | HEART RATE: 81 BPM | RESPIRATION RATE: 16 BRPM

## 2023-10-11 DIAGNOSIS — Z12.31 BREAST CANCER SCREENING BY MAMMOGRAM: ICD-10-CM

## 2023-10-11 DIAGNOSIS — E55.9 VITAMIN D DEFICIENCY, UNSPECIFIED: ICD-10-CM

## 2023-10-11 DIAGNOSIS — I10 ESSENTIAL (PRIMARY) HYPERTENSION: ICD-10-CM

## 2023-10-11 DIAGNOSIS — M15.9 PRIMARY OSTEOARTHRITIS INVOLVING MULTIPLE JOINTS: ICD-10-CM

## 2023-10-11 DIAGNOSIS — E78.00 PURE HYPERCHOLESTEROLEMIA, UNSPECIFIED: Primary | ICD-10-CM

## 2023-10-11 DIAGNOSIS — D70.9 NEUTROPENIA, UNSPECIFIED TYPE (HCC): ICD-10-CM

## 2023-10-11 PROCEDURE — 3079F DIAST BP 80-89 MM HG: CPT | Performed by: STUDENT IN AN ORGANIZED HEALTH CARE EDUCATION/TRAINING PROGRAM

## 2023-10-11 PROCEDURE — 99214 OFFICE O/P EST MOD 30 MIN: CPT | Performed by: STUDENT IN AN ORGANIZED HEALTH CARE EDUCATION/TRAINING PROGRAM

## 2023-10-11 PROCEDURE — 97530 THERAPEUTIC ACTIVITIES: CPT

## 2023-10-11 PROCEDURE — 97110 THERAPEUTIC EXERCISES: CPT

## 2023-10-11 PROCEDURE — 3075F SYST BP GE 130 - 139MM HG: CPT | Performed by: STUDENT IN AN ORGANIZED HEALTH CARE EDUCATION/TRAINING PROGRAM

## 2023-10-11 PROCEDURE — 97112 NEUROMUSCULAR REEDUCATION: CPT

## 2023-10-11 RX ORDER — PRAVASTATIN SODIUM 10 MG
10 TABLET ORAL DAILY
Qty: 90 TABLET | Refills: 1 | Status: SHIPPED | OUTPATIENT
Start: 2023-10-11

## 2023-10-11 SDOH — ECONOMIC STABILITY: INCOME INSECURITY: HOW HARD IS IT FOR YOU TO PAY FOR THE VERY BASICS LIKE FOOD, HOUSING, MEDICAL CARE, AND HEATING?: NOT HARD AT ALL

## 2023-10-11 SDOH — ECONOMIC STABILITY: FOOD INSECURITY: WITHIN THE PAST 12 MONTHS, YOU WORRIED THAT YOUR FOOD WOULD RUN OUT BEFORE YOU GOT MONEY TO BUY MORE.: NEVER TRUE

## 2023-10-11 SDOH — ECONOMIC STABILITY: FOOD INSECURITY: WITHIN THE PAST 12 MONTHS, THE FOOD YOU BOUGHT JUST DIDN'T LAST AND YOU DIDN'T HAVE MONEY TO GET MORE.: NEVER TRUE

## 2023-10-11 ASSESSMENT — PATIENT HEALTH QUESTIONNAIRE - PHQ9
SUM OF ALL RESPONSES TO PHQ QUESTIONS 1-9: 0
SUM OF ALL RESPONSES TO PHQ9 QUESTIONS 1 & 2: 0
SUM OF ALL RESPONSES TO PHQ QUESTIONS 1-9: 0
2. FEELING DOWN, DEPRESSED OR HOPELESS: 0
1. LITTLE INTEREST OR PLEASURE IN DOING THINGS: 0
SUM OF ALL RESPONSES TO PHQ QUESTIONS 1-9: 0
SUM OF ALL RESPONSES TO PHQ QUESTIONS 1-9: 0

## 2023-10-11 ASSESSMENT — ENCOUNTER SYMPTOMS
DIARRHEA: 0
VOMITING: 0
NAUSEA: 0
WHEEZING: 0
RHINORRHEA: 0
BLOOD IN STOOL: 0
SHORTNESS OF BREATH: 0
ABDOMINAL PAIN: 0
CHEST TIGHTNESS: 0
COUGH: 0
BACK PAIN: 0

## 2023-10-11 ASSESSMENT — ANXIETY QUESTIONNAIRES
GAD7 TOTAL SCORE: 0
3. WORRYING TOO MUCH ABOUT DIFFERENT THINGS: 0
4. TROUBLE RELAXING: 0
1. FEELING NERVOUS, ANXIOUS, OR ON EDGE: 0
6. BECOMING EASILY ANNOYED OR IRRITABLE: 0
5. BEING SO RESTLESS THAT IT IS HARD TO SIT STILL: 0
7. FEELING AFRAID AS IF SOMETHING AWFUL MIGHT HAPPEN: 0
2. NOT BEING ABLE TO STOP OR CONTROL WORRYING: 0
IF YOU CHECKED OFF ANY PROBLEMS ON THIS QUESTIONNAIRE, HOW DIFFICULT HAVE THESE PROBLEMS MADE IT FOR YOU TO DO YOUR WORK, TAKE CARE OF THINGS AT HOME, OR GET ALONG WITH OTHER PEOPLE: NOT DIFFICULT AT ALL

## 2023-10-11 NOTE — PROGRESS NOTES
Note/Recertification  Short Term Goals: To be accomplished in  3-4  weeks:  1. Independent with HEP. EVAL: HEP established and given to patient , 10/5/23  Current : patient reports that she is doing her HEP  4 times per week, 10/5/23  2. Decrease  R knee pain to 2/10  to assist with ADLS and walking   EVAL: 4/10      Long Term Goals: To be accomplished in  6-8  weeks:  1. Decrease  R knee pain to 0/10  to assist with ADLS and walking   EVAL: 4/10      2. Improve FOTO Functional Status Score by  12 points in order to show significant functional improvement. EVAL: 55  3.    To improve R knee Flex/ Ext , R hip Flex to 4+/5  to 5/5 in order to assist with ADLS and walking   EVAL:  3+/5      Next PN  due 10/14/23       PLAN  - Continue Plan of Care  - Upgrade activities as tolerated    Antonio Fink, PT    10/11/2023    8:28 AM    Future Appointments   Date Time Provider [address] [place] [address]   10/11/2023  8:50 AM Antonio Fink, PT [facility]   10/11/2023 10:20 AM MD DOMINGA Ramirez BS AMB   10/13/2023  8:50 AM Salena Emanuel, PT [facility]   10/16/2023  8:50 AM Odette Arreola, PTA [facility]   10/17/2023  9:40 AM MD DOMINGA Ramierz BS AMB   10/18/2023  8:50 AM Odette Arreola, PTA [facility]   10/24/2023  8:50 AM Odette Arreola, PTA [facility]   10/26/2023  8:10 AM Chaya Sutton, PT [facility]

## 2023-10-11 NOTE — PROGRESS NOTES
Yvan Hawkins is a 58 y.o. female presenting today for Follow-up  . Chief Complaint   Patient presents with    Follow-up       HPI:  Yvan Hawkins presents to the office today for follow up. Patient has a past medical history of HTN, HLD, OA.     HLD: Patient has a history of HLD with extensive family history of DM, HLD. She tried crestor, lipitor, and another statin but developed severe myalgias. She was switched to Zetia - with no side effects. She stopped taking it 2 yrs ago coz she felt like it wasn't doing anything. She has been exercising to stay healthy. 4 yrs ago she had intermittent chest pain while exercising - she underwent a stress test which was negative. Lipid panel in 2/22 showed , HDL 92, triglycerides 69. She was resumed on pravastatin. Patient has been tolerating it well with no myalgias. Repeat lipid panel in 4/23 showed 139, HDL 88, total cholesterol 240. Patient states she has history of elevated LFTs. Screening for hepatitis was negative at that time. She had a liver biopsy which was negative 25 yrs ago. No cause was identified. Recent LFTs are normal.     Pt underwent a hysterectomy in 2017 after atypical cells were noted on the cervix. Neutropenia: Patient was following with hematology. Last CBC showed WBC 2.7 with absolute neutrophils 1.1. SPEP reported within normal limits-with no suspicious bands. Peripheral blood cytometry showed no evidence of myeloid neoplasm or lymphoproliferative disorder. Etiology was unclear. Per hematology-since his studies were negative neutrophil count remains greater than thousand without other cytopenias-continue observation unless counts worsen or she develops recurrent infections. Chronic back pain: She used to go to a chiropracter in the past and was told she had OA. She has been taking ibuprofen intermittently which relieves the pain. Patient was referred to PT with improvement.   X-ray showed moderately severe

## 2023-10-13 ENCOUNTER — HOSPITAL ENCOUNTER (OUTPATIENT)
Facility: HOSPITAL | Age: 63
Setting detail: RECURRING SERIES
Discharge: HOME | End: 2023-10-16
Payer: COMMERCIAL

## 2023-10-13 PROCEDURE — 97530 THERAPEUTIC ACTIVITIES: CPT

## 2023-10-13 PROCEDURE — 97110 THERAPEUTIC EXERCISES: CPT

## 2023-10-13 PROCEDURE — 97535 SELF CARE MNGMENT TRAINING: CPT

## 2023-10-13 NOTE — PROGRESS NOTES
goals stated above. Continue per initial Plan of Care. If you have any questions/comments please contact us directly. Thank you for allowing us to assist in the care of your patient.     Tucker Arana, PT       10/13/2023       6:41 PM
10/17/2023  9:40 AM MD DOMINGA Jang BS AMB   10/24/2023  8:50 AM Andre Moreno, PTA [facility]   10/26/2023  8:10 AM Georgiana Evans, PT [facility]   4/11/2024  8:40 AM Remy Gunderson MD [facility] BS AMB

## 2023-10-16 ENCOUNTER — HOSPITAL ENCOUNTER (OUTPATIENT)
Facility: HOSPITAL | Age: 63
Setting detail: RECURRING SERIES
Discharge: HOME | End: 2023-10-19
Payer: COMMERCIAL

## 2023-10-16 PROCEDURE — 97530 THERAPEUTIC ACTIVITIES: CPT

## 2023-10-16 PROCEDURE — 97140 MANUAL THERAPY 1/> REGIONS: CPT

## 2023-10-16 NOTE — PROGRESS NOTES
PHYSICAL / OCCUPATIONAL THERAPY - DAILY TREATMENT NOTE (updated )    Patient Name: Bradford Molina    Date: 10/16/2023    : 1960  Insurance: Payor: Dolores Burdick / Plan: OPTIMA HMO / Product Type: *No Product type* /      Patient  verified Yes     Visit #   Current / Total 1 12   Time   In / Out 9:14 10:01   Pain   In / Out 3 3  not as tense   Subjective Functional Status/Changes: \"Pain is behind my knee. \"     TREATMENT AREA =  Pain in right knee [M25.561]    OBJECTIVE    Modalities Rationale:     decrease pain and inflammation to improve patient's ability to progress to PLOF and address remaining functional goals. min [] Estim Unattended, type/location:                                      []  w/ice    []  w/heat    min [] Estim Attended, type/location:                                     []  w/US     []  w/ice    []  w/heat    []  TENS insruct      min []  Mechanical Traction: type/lbs                   []  pro   []  sup   []  int   []  cont    []  before manual    []  after manual    min []  Ultrasound, settings/location:     10 min  unbill [x]  Ice     []  Heat    location/position: Long sit right  right knee    min []  Paraffin,  details:     min []  Vasopneumatic Device, press/temp:     min []  Naz Faith / Nichol Lasso: If using vaso (only need to measure limb vaso being performed on)      pre-treatment girth :       post-treatment girth :       measured at (landmark location) :      min []  Other:    Skin assessment post-treatment:   Intact      Therapeutic Procedures: Tx Min Billable or 1:1 Min (if diff from Tx Min) Procedure, Rationale, Specifics   14  62961 Therapeutic Exercise (timed):  increase ROM, strength, coordination, balance, and proprioception to improve patient's ability to progress to PLOF and address remaining functional goals.  (see flow sheet as applicable)     Details if applicable:  added  SL leg press     13  08259 Therapeutic Activity (timed):  use of dynamic activities DISPLAY PLAN FREE TEXT

## 2023-10-18 ENCOUNTER — APPOINTMENT (OUTPATIENT)
Facility: HOSPITAL | Age: 63
End: 2023-10-18
Payer: COMMERCIAL

## 2023-10-24 ENCOUNTER — HOSPITAL ENCOUNTER (OUTPATIENT)
Facility: HOSPITAL | Age: 63
Setting detail: RECURRING SERIES
Discharge: HOME | End: 2023-10-27
Payer: COMMERCIAL

## 2023-10-24 PROCEDURE — 97140 MANUAL THERAPY 1/> REGIONS: CPT

## 2023-10-24 PROCEDURE — 97530 THERAPEUTIC ACTIVITIES: CPT

## 2023-10-24 PROCEDURE — 97110 THERAPEUTIC EXERCISES: CPT

## 2023-10-24 NOTE — PROGRESS NOTES
PHYSICAL / OCCUPATIONAL THERAPY - DAILY TREATMENT NOTE (updated )    Patient Name: Rolan Rees    Date: 10/24/2023    : 1960  Insurance: Payor: Helen Molina / Plan: OPTIMA HMO / Product Type: *No Product type* /      Patient  verified Yes     Visit #   Current / Total 2 12   Time   In / Out 9:05 9:54   Pain   In / Out 4 0   Subjective Functional Status/Changes: \"The longer I sit the harder it is to get up with pain. \"     TREATMENT AREA =  Pain in right knee [M25.561]    OBJECTIVE    Modalities Rationale:     decrease pain to improve patient's ability to progress to PLOF and address remaining functional goals. min [] Estim Unattended, type/location:                                      []  w/ice    []  w/heat    min [] Estim Attended, type/location:                                     []  w/US     []  w/ice    []  w/heat    []  TENS insruct      min []  Mechanical Traction: type/lbs                   []  pro   []  sup   []  int   []  cont    []  before manual    []  after manual    min []  Ultrasound, settings/location:     10 min  unbill [x]  Ice     []  Heat    location/position: Semi reclined right knee    min []  Paraffin,  details:     min []  Vasopneumatic Device, press/temp:     min []  Starleen Nell / Verlon Brightly: If using vaso (only need to measure limb vaso being performed on)      pre-treatment girth :       post-treatment girth :       measured at (landmark location) :      min []  Other:    Skin assessment post-treatment:   Intact      Therapeutic Procedures: Tx Min Billable or 1:1 Min (if diff from Tx Min) Procedure, Rationale, Specifics   16  51748 Therapeutic Exercise (timed):  increase ROM, strength, coordination, balance, and proprioception to improve patient's ability to progress to PLOF and address remaining functional goals.  (see flow sheet as applicable)     Details if applicable:       15  05899 Therapeutic Activity (timed):  use of dynamic activities replicating functional

## 2023-10-26 ENCOUNTER — HOSPITAL ENCOUNTER (OUTPATIENT)
Facility: HOSPITAL | Age: 63
Setting detail: RECURRING SERIES
Discharge: HOME | End: 2023-10-29
Payer: COMMERCIAL

## 2023-10-26 PROCEDURE — 97530 THERAPEUTIC ACTIVITIES: CPT

## 2023-10-26 PROCEDURE — 97110 THERAPEUTIC EXERCISES: CPT

## 2023-10-26 PROCEDURE — 97140 MANUAL THERAPY 1/> REGIONS: CPT

## 2023-10-26 NOTE — PROGRESS NOTES
PHYSICAL / OCCUPATIONAL THERAPY - DAILY TREATMENT NOTE (updated )    Patient Name: Krystyna Nina    Date: 10/26/2023    : 1960  Insurance: Payor: Trina Zamorano / Plan: OPTIMA HMO / Product Type: *No Product type* /      Patient  verified Yes     Visit #   Current / Total 3 12   Time   In / Out 809 905   Pain   In / Out 2 1   Subjective Functional Status/Changes: Reports doing better, pain is decreasing. TREATMENT AREA =  Pain in right knee [M25.561]    OBJECTIVE    Modalities Rationale:     decrease pain and increase tissue extensibility to improve patient's ability to progress to PLOF and address remaining functional goals. min [] Estim Unattended, type/location:                                      []  w/ice    []  w/heat    min [] Estim Attended, type/location:                                     []  w/US     []  w/ice    []  w/heat    []  TENS insruct      min []  Mechanical Traction: type/lbs                   []  pro   []  sup   []  int   []  cont    []  before manual    []  after manual    min []  Ultrasound, settings/location:     10 min  unbill [x]  Ice   post   []  Heat    location/position:reclined right knee     min []  Paraffin,  details:     min []  Vasopneumatic Device, press/temp:     min []  Raeann Galvez / Shy Aguilar: If using vaso (only need to measure limb vaso being performed on)      pre-treatment girth :       post-treatment girth :       measured at (landmark location) :      min []  Other:    Skin assessment post-treatment:   Intact      Therapeutic Procedures: Tx Min Billable or 1:1 Min (if diff from Tx Min) Procedure, Rationale, Specifics   20 20 01549 Therapeutic Exercise (timed):  increase ROM, strength, coordination, balance, and proprioception to improve patient's ability to progress to PLOF and address remaining functional goals.  (see flow sheet as applicable)     Details if applicable:       12 12 15802 Therapeutic Activity (timed):  use of dynamic activities

## 2023-10-31 ENCOUNTER — HOSPITAL ENCOUNTER (OUTPATIENT)
Facility: HOSPITAL | Age: 63
Setting detail: RECURRING SERIES
Discharge: HOME | End: 2023-11-03
Payer: COMMERCIAL

## 2023-10-31 PROCEDURE — 97110 THERAPEUTIC EXERCISES: CPT

## 2023-10-31 PROCEDURE — 97035 APP MDLTY 1+ULTRASOUND EA 15: CPT

## 2023-10-31 PROCEDURE — 97530 THERAPEUTIC ACTIVITIES: CPT

## 2023-10-31 NOTE — PROGRESS NOTES
Activity (timed):  use of dynamic activities replicating functional movements to increase ROM, strength, coordination, balance, and proprioception in order to improve patient's ability to progress to PLOF and address remaining functional goals. (see flow sheet as applicable)     Details if applicable:  added  8\" step          Details if applicable:            Details if applicable:            Details if applicable:     39  [facility] Totals Reminder: bill using total billable min of TIMED therapeutic procedures (example: do not include dry needle or estim unattended, both untimed codes, in totals to left)  8-22 min = 1 unit; 23-37 min = 2 units; 38-52 min = 3 units; 53-67 min = 4 units; 68-82 min = 5 units   Total Total     [x]  Patient Education billed concurrently with other procedures   [x] Review HEP    [] Progressed/Changed HEP, detail:    [] Other detail:       Objective Information/Functional Measures/Assessment  Pt responded well to each strengthening there ex today with no increased pain. Pt is currently ambulating with good step length. Following there ex pt continued  with minimal residual pain at superior aspect right knee. Pt benefited with US due to decrease pain. Patient will continue to benefit from skilled PT / OT services to modify and progress therapeutic interventions, analyze and address functional mobility deficits, analyze and address ROM deficits, analyze and address strength deficits, analyze and address soft tissue restrictions, analyze and cue for proper movement patterns, analyze and modify for postural abnormalities, analyze and address imbalance/dizziness, and instruct in home and community integration to address functional deficits and attain remaining goals. Progress toward goals / Updated goals:  []  See Progress Note/Recertification  1. Independent with HEP.   EVAL: HEP established and given to patient , 10/5/23  Current : patient reports that she is doing her HEP  10/26/23

## 2023-11-02 ENCOUNTER — HOSPITAL ENCOUNTER (OUTPATIENT)
Facility: HOSPITAL | Age: 63
Setting detail: RECURRING SERIES
Discharge: HOME | End: 2023-11-05
Payer: COMMERCIAL

## 2023-11-02 PROCEDURE — 97110 THERAPEUTIC EXERCISES: CPT

## 2023-11-02 PROCEDURE — 97530 THERAPEUTIC ACTIVITIES: CPT

## 2023-11-02 NOTE — PROGRESS NOTES
PHYSICAL / OCCUPATIONAL THERAPY - DAILY TREATMENT NOTE (updated )    Patient Name: Minerva Zuinga    Date: 2023    : 1960  Insurance: Payor: Melanie Angelica / Plan: OPTIMA HMO / Product Type: *No Product type* /      Patient  verified Yes     Visit #   Current / Total 5 12   Time   In / Out 9:38 10:23   Pain   In / Out 3/10 2/10   Subjective Functional Status/Changes: Pt reports she has had more pain and swelling in her R knee over the last few days, as well increased buckling. 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 TREATMENT AREA =  Pain in right knee [M25.561]    OBJECTIVE    Modalities Rationale:     decrease pain and increase tissue extensibility to improve patient's ability to progress to PLOF and address remaining functional goals. min [] Estim Unattended, type/location:                                      []  w/ice    []  w/heat    min [] Estim Attended, type/location:                                     []  w/US     []  w/ice    []  w/heat    []  TENS insruct      min []  Mechanical Traction: type/lbs                   []  pro   []  sup   []  int   []  cont    []  before manual    []  after manual    min []  Ultrasound, settings/location:     10 min  unbill [x]  Ice     []  Heat    location/position: To R knee in reclined long sit    min []  Paraffin,  details:     min []  Vasopneumatic Device, press/temp:     min []  Cuatedolyn Eaton / Claudy Beau: If using vaso (only need to measure limb vaso being performed on)      pre-treatment girth :       post-treatment girth :       measured at (landmark location) :      min []  Other:    Skin assessment post-treatment:   Intact      Therapeutic Procedures:     Tx Min Billable or 1:1 Min (if diff from Tx Min) Procedure, Rationale, Specifics   45 [id number] Therapeutic Exercise (timed):  increase ROM, strength, coordination, balance, and proprioception to improve patient's ability to progress to PLOF and

## 2023-11-07 ENCOUNTER — APPOINTMENT (OUTPATIENT)
Facility: HOSPITAL | Age: 63
End: 2023-11-07
Payer: COMMERCIAL

## 2023-11-09 ENCOUNTER — HOSPITAL ENCOUNTER (OUTPATIENT)
Facility: HOSPITAL | Age: 63
Setting detail: RECURRING SERIES
Discharge: HOME | End: 2023-11-12
Payer: COMMERCIAL

## 2023-11-09 PROCEDURE — 97110 THERAPEUTIC EXERCISES: CPT

## 2023-11-09 PROCEDURE — 97530 THERAPEUTIC ACTIVITIES: CPT

## 2023-11-09 PROCEDURE — 97112 NEUROMUSCULAR REEDUCATION: CPT

## 2023-11-09 NOTE — PROGRESS NOTES
[facility]  [address] [facility], [address] [place] [address]  ZK:[phone] UA:[phone]  PHYSICAL THERAPY PROGRESS NOTE  Patient Name: Katerina Cyr : 1960   Treatment/Medical Diagnosis: Pain in right knee [M25.561]   Referral Source: HUONG Richardson     Date of Initial Visit: 23 Attended Visits: 11 Missed Visits: 0     SUMMARY OF TREATMENT  Patient seen for eval and 10 follow up sessions. CURRENT STATUS     1. Independent with HEP. EVAL: HEP established and given to patient , 10/5/23  Current : patient reports that she is doing her HEP  23     2  Decrease  R knee pain to 0/10  to assist with ADLS and walking   EVAL: 4/10   Current: 2 10/26/23    3 23     3  Improve FOTO Functional Status Score by  12 points in order to show significant functional improvement. EVAL: 55  Current: 56 23     4. To improve R knee Flex/ Ext , R hip Flex to 4+/5  to 5/5 in order to assist with ADLS and walking   EVAL:  3+/5   Current :   knee flex R 4+/5, ext 4+,5/5; hip flex 4-,4 /5;    23  goal in progress        Functional Gains: a little more flexibility and strength, decrease in the pain being steady- now more up and down  Functional Deficits: I can't bend like if I want to squat, steps going down> up are still a little painful, bone on bone feeling felt with walking , hip weakness with walking   % improvement: 35%  Pain   Average: 3/10       Best: 1/10     Worst: 4/10  Patient Goal: \"further strengthen the knee and hip \"    Payor: Hazel Fair / Plan: OPTIMA HMO / Product Type: *No Product type* /     Non-Medicare, can change goals, can adjust or add frequency duration, no signature required      New Goals to be achieved in __5__ treatments   1. Independent with HEP.   PN : patient reports that she is doing her HEP  23     2  Decrease  R knee pain to 0-1 /10  to assist with ADLS and walking   PN    2 10/26/23    3 23     3
10/5/23  Current : patient reports that she is doing her HEP  11/9/23     2  Decrease  R knee pain to 0/10  to assist with ADLS and walking   EVAL: 4/10   Current: 2 10/26/23    3 11/9/23     3  Improve FOTO Functional Status Score by  12 points in order to show significant functional improvement. EVAL: 55  Current: 56 11/9/23     4.    To improve R knee Flex/ Ext , R hip Flex to 4+/5  to 5/5 in order to assist with ADLS and walking   EVAL:  3+/5   Current :   knee flex R 4+/5, ext 4+,5/5; hip flex 4-,4 /5;    11/9/23  goal in progress           Next PN/ RC due       PN completed 11/9/23    PN now due 12/9/23  Auth due 16 visits  12/31/23  PLAN  - [address] activities as tolerated    Miguel Angel Ayala, PT    11/9/2023    9:39 AM    Future Appointments   Date Time Provider [address] [place] [address]   11/16/2023  8:10 AM Miguel Angel Ayala, PT [facility]   11/20/2023  9:30 AM Loi Connolly, PT [facility]   11/28/2023  9:30 AM Miguel Angel Ayala, PT [facility]   11/30/2023 10:10 AM Monty Azevedo, PTA [facility]   12/1/2023  9:30 AM Si Cathleen, PT [facility]   12/26/2023  3:00  [facility]   4/11/2024  8:40 AM Roz Gunderson MD [facility] BS AMB

## 2023-11-11 DIAGNOSIS — D70.9 NEUTROPENIA, UNSPECIFIED TYPE (HCC): ICD-10-CM

## 2023-11-14 ENCOUNTER — APPOINTMENT (OUTPATIENT)
Facility: HOSPITAL | Age: 63
End: 2023-11-14
Payer: COMMERCIAL

## 2023-11-16 ENCOUNTER — HOSPITAL ENCOUNTER (OUTPATIENT)
Facility: HOSPITAL | Age: 63
Setting detail: RECURRING SERIES
Discharge: HOME | End: 2023-11-19
Payer: COMMERCIAL

## 2023-11-16 PROCEDURE — 97112 NEUROMUSCULAR REEDUCATION: CPT

## 2023-11-16 PROCEDURE — 97140 MANUAL THERAPY 1/> REGIONS: CPT

## 2023-11-16 PROCEDURE — 97110 THERAPEUTIC EXERCISES: CPT

## 2023-11-16 NOTE — PROGRESS NOTES
PHYSICAL / OCCUPATIONAL THERAPY - DAILY TREATMENT NOTE (updated )    Patient Name: Cale Briceno    Date: 2023    : 1960  Insurance: Payor: Andrey No / Plan: OPTIMA HMO / Product Type: *No Product type* /      Patient  verified Yes     Visit #   Current / Total 1 5   Time   In / Out 815 856   Pain   In / Out 2 0   Subjective Functional Status/Changes: Reports 2/10 pain at arrival.      TREATMENT AREA =  Pain in right knee [M25.561]    OBJECTIVE   Therapeutic Procedures: Tx Min Billable or 1:1 Min (if diff from Tx Min) Procedure, Rationale, Specifics   16 16 52999 Therapeutic Exercise (timed):  increase ROM, strength, coordination, balance, and proprioception to improve patient's ability to progress to PLOF and address remaining functional goals. (see flow sheet as applicable)     Details if applicable:       15 15 38121 Neuromuscular Re-Education (timed):  improve balance, coordination, kinesthetic sense, posture, core stability and proprioception to improve patient's ability to develop conscious control of individual muscles and awareness of position of extremities in order to progress to PLOF and address remaining functional goals. (see flow sheet as applicable)     Details if applicable:     10 10 33488 Manual Therapy (timed):  decrease pain, increase ROM, and increase tissue extensibility to improve patient's ability to progress to PLOF and address remaining functional goals. The manual therapy interventions were performed at a separate and distinct time from the therapeutic activities interventions .  (see flow sheet as applicable)     Details if applicable: reclined, right knee, patellar mobs, STM/DTM distal Q/H/ITBand           Details if applicable:            Details if applicable:     39 41 3600 [facility] Reminder: bill using total billable min of TIMED therapeutic procedures (example: do not include dry needle or estim unattended, both untimed codes, in totals to left)  8-22 min = 1

## 2023-11-20 ENCOUNTER — HOSPITAL ENCOUNTER (OUTPATIENT)
Facility: HOSPITAL | Age: 63
Setting detail: RECURRING SERIES
Discharge: HOME | End: 2023-11-23
Payer: COMMERCIAL

## 2023-11-20 PROCEDURE — 97110 THERAPEUTIC EXERCISES: CPT

## 2023-11-20 PROCEDURE — 97530 THERAPEUTIC ACTIVITIES: CPT

## 2023-11-20 NOTE — PROGRESS NOTES
assist with ADLS and walking   PN    2 10/26/23    3 11/9/23   CURRENT 2 at arrival, 0 at departure 11/16/23     3  Improve FOTO Functional Status Score by  12 points to 67  in order to show significant functional improvement from eval baseline  PN   56 11/9/23  CURRENT NA 11/16/23     4. To improve R knee Flex/ Ext , R hip Flex to 4+/5  to 5/5 in order to assist with ADLS and walking   PN    knee flex R 4+/5, ext 4+,5/5; hip flex 4-,4 /5;    11/9/23  goal in progress   CURRENT  NA 11/16/23      5 patient will tolerate TM  . 25 miles  with no increase pain for carryover to community activities  PN NA for distance  CURRENT NA 11/16/23      Next PN/ RC due   PN   12/9/23  Auth due 12/31/23    PLAN  - Continue Plan of Care  - Upgrade activities as tolerated    Carly Schofield, PT    11/20/2023    8:20 AM    Future Appointments   Date Time Provider [address]   11/20/2023  9:30 AM Carly Schofield, PT [facility]   11/28/2023  9:30 AM Annie Prado, PT [facility]   11/30/2023 10:10 AM Allyssa Mackey, PTA [facility]   12/1/2023  9:30 AM Carly Schofield, PT [facility]   12/26/2023  3:00  [facility] 2 [address] [place] [facility]   4/11/2024  8:40 AM Roseanna Gunderson MD [facility] BS AMB

## 2023-11-22 ENCOUNTER — APPOINTMENT (OUTPATIENT)
Facility: HOSPITAL | Age: 63
End: 2023-11-22
Payer: COMMERCIAL

## 2023-11-28 ENCOUNTER — HOSPITAL ENCOUNTER (OUTPATIENT)
Facility: HOSPITAL | Age: 63
Setting detail: RECURRING SERIES
Discharge: HOME | End: 2023-12-01
Payer: COMMERCIAL

## 2023-11-28 PROCEDURE — 97112 NEUROMUSCULAR REEDUCATION: CPT

## 2023-11-28 PROCEDURE — 97110 THERAPEUTIC EXERCISES: CPT

## 2023-11-28 PROCEDURE — 97530 THERAPEUTIC ACTIVITIES: CPT

## 2023-11-28 NOTE — PROGRESS NOTES
PHYSICAL / OCCUPATIONAL THERAPY - DAILY TREATMENT NOTE (updated )    Patient Name: Sonia Harrington    Date: 2023    : 1960  Insurance: Payor: Rocio Him / Plan: OPTIMA HMO / Product Type: *No Product type* /      Patient  verified Yes     Visit #   Current / Total 3 5   Time   In / Out 938 1025   Pain   In / Out 1 1   Subjective Functional Status/Changes: I am getting a little better. TREATMENT AREA =  Pain in right knee [M25.561]    OBJECTIVE    Modalities Rationale:     decrease pain and post exercise  to improve patient's ability to progress to PLOF and address remaining functional goals. min [] Estim Unattended, type/location:                                      []  w/ice    []  w/heat    min [] Estim Attended, type/location:                                     []  w/US     []  w/ice    []  w/heat    []  TENS insruct      min []  Mechanical Traction: type/lbs                   []  pro   []  sup   []  int   []  cont    []  before manual    []  after manual    min []  Ultrasound, settings/location:     10 min  unbill [x]  Ice  post    []  Heat    location/position: supine right knee     min []  Paraffin,  details:     min []  Vasopneumatic Device, press/temp:     min []  Sanna Salon / Delos Overcast: If using vaso (only need to measure limb vaso being performed on)      pre-treatment girth :       post-treatment girth :       measured at (landmark location) :      min []  Other:    Skin assessment post-treatment:   Intact      Therapeutic Procedures: Tx Min Billable or 1:1 Min (if diff from Tx Min) Procedure, Rationale, Specifics    Therapeutic Exercise (timed):  increase ROM, strength, coordination, balance, and proprioception to improve patient's ability to progress to PLOF and address remaining functional goals.  (see flow sheet as applicable)     Details if applicable:       10 10 95521 Therapeutic Activity (timed):  use of dynamic activities replicating functional movements to

## 2023-11-30 ENCOUNTER — HOSPITAL ENCOUNTER (OUTPATIENT)
Facility: HOSPITAL | Age: 63
Setting detail: RECURRING SERIES
End: 2023-11-30
Payer: COMMERCIAL

## 2023-11-30 PROCEDURE — 97110 THERAPEUTIC EXERCISES: CPT

## 2023-11-30 PROCEDURE — 97530 THERAPEUTIC ACTIVITIES: CPT

## 2023-11-30 PROCEDURE — 97112 NEUROMUSCULAR REEDUCATION: CPT

## 2023-11-30 NOTE — PROGRESS NOTES
PHYSICAL / OCCUPATIONAL THERAPY - DAILY TREATMENT NOTE (updated )    Patient Name: Dayan Cantu    Date: 2023    : 1960  Insurance: Payor: Kushal Stout / Plan: OPTIMA HMO / Product Type: *No Product type* /      Patient  verified Yes     Visit #   Current / Total 4 5   Time   In / Out 1019 am  1111 am   Pain   In / Out 0/10  0/10    Subjective Functional Status/Changes: Patient reports no right knee pain today. TREATMENT AREA =  Pain in right knee [M25.561]    OBJECTIVE    Modalities Rationale:     decrease edema, decrease inflammation, and decrease pain to improve patient's ability to progress to PLOF and address remaining functional goals. min [] Estim Unattended, type/location:                                      []  w/ice    []  w/heat    min [] Estim Attended, type/location:                                     []  w/US     []  w/ice    []  w/heat    []  TENS insruct      min []  Mechanical Traction: type/lbs                   []  pro   []  sup   []  int   []  cont    []  before manual    []  after manual    min []  Ultrasound, settings/location:     10 min  unbill [x]  Ice     []  Heat    location/position: Supine to right knee     min []  Paraffin,  details:     min []  Vasopneumatic Device, press/temp:     min []  Earl Hutton / Nicolasa Babb: If using vaso (only need to measure limb vaso being performed on)      pre-treatment girth :       post-treatment girth :       measured at (landmark location) :      min []  Other:    Skin assessment post-treatment:   Intact      Therapeutic Procedures: Tx Min Billable or 1:1 Min (if diff from Tx Min) Procedure, Rationale, Specifics   11  45096 Therapeutic Exercise (timed):  increase ROM, strength, coordination, balance, and proprioception to improve patient's ability to progress to PLOF and address remaining functional goals.  (see flow sheet as applicable)     Details if applicable:       8  00183 Neuromuscular Re-Education (timed):  improve balance, coordination, kinesthetic sense, posture, core stability and proprioception to improve patient's ability to develop conscious control of individual muscles and awareness of position of extremities in order to progress to PLOF and address remaining functional goals. (see flow sheet as applicable)     Details if applicable:     23  42092 Therapeutic Activity (timed):  use of dynamic activities replicating functional movements to increase ROM, strength, coordination, balance, and proprioception in order to improve patient's ability to progress to PLOF and address remaining functional goals. (see flow sheet as applicable)     Details if applicable:            Details if applicable:            Details if applicable:     43  [facility] Totals Reminder: bill using total billable min of TIMED therapeutic procedures (example: do not include dry needle or estim unattended, both untimed codes, in totals to left)  8-22 min = 1 unit; 23-37 min = 2 units; 38-52 min = 3 units; 53-67 min = 4 units; 68-82 min = 5 units   Total Total     [x]  Patient Education billed concurrently with other procedures   [x] Review HEP    [] Progressed/Changed HEP, detail:    [] Other detail:       Objective Information/Functional Measures/Assessment    Patient presents to today's session with no reports of right knee pain. Patient performed exercises, as per flow sheet, to further assist with increasing right knee strength and stability for functional tasks. Patient tolerated exercises well with no increase in right knee pain. No change in pain was reported at the end of today's session. Discussed with patient current POC and that she has one remaining visit. Patient stated that she would like to be discharged at her next session.      Patient will continue to benefit from skilled PT / OT services to modify and progress therapeutic interventions, analyze and address functional mobility deficits, analyze and address ROM deficits, analyze and

## 2023-12-01 ENCOUNTER — HOSPITAL ENCOUNTER (OUTPATIENT)
Facility: HOSPITAL | Age: 63
Setting detail: RECURRING SERIES
Discharge: HOME | End: 2023-12-04
Payer: COMMERCIAL

## 2023-12-01 PROCEDURE — 97530 THERAPEUTIC ACTIVITIES: CPT

## 2023-12-01 PROCEDURE — 97110 THERAPEUTIC EXERCISES: CPT

## 2023-12-01 NOTE — PROGRESS NOTES
PHYSICAL / OCCUPATIONAL THERAPY - DAILY TREATMENT NOTE (updated )    Patient Name: Anthony De Souza    Date: 2023    : 1960  Insurance: Payor: Dimitri Peter / Plan: OPTIMA HMO / Product Type: *No Product type* /      Patient  verified Yes     Visit #   Current / Total 5 5   Time   In / Out 9:37 10:22   Pain   In / Out 0 0   Subjective Functional Status/Changes: \"Im doing good. \"     TREATMENT AREA =  Pain in right knee [M25.561]    OBJECTIVE    Modalities Rationale:     decrease inflammation to improve patient's ability to progress to PLOF and address remaining functional goals. min [] Estim Unattended, type/location:                                      []  w/ice    []  w/heat    min [] Estim Attended, type/location:                                     []  w/US     []  w/ice    []  w/heat    []  TENS insruct      min []  Mechanical Traction: type/lbs                   []  pro   []  sup   []  int   []  cont    []  before manual    []  after manual    min []  Ultrasound, settings/location:     10 min  unbill [x]  Ice     []  Heat    location/position: Long sit  right knee    min []  Paraffin,  details:     min []  Vasopneumatic Device, press/temp:     min []  Sharion Cuenca / Criselda Laws: If using vaso (only need to measure limb vaso being performed on)      pre-treatment girth :       post-treatment girth :       measured at (landmark location) :      min []  Other:    Skin assessment post-treatment:   Intact      Therapeutic Procedures: Tx Min Billable or 1:1 Min (if diff from Tx Min) Procedure, Rationale, Specifics   13  95826 Therapeutic Exercise (timed):  increase ROM, strength, coordination, balance, and proprioception to improve patient's ability to progress to PLOF and address remaining functional goals.  (see flow sheet as applicable)     Details if applicable:       22  99853 Therapeutic Activity (timed):  use of dynamic activities replicating functional movements to increase ROM, strength,

## 2023-12-01 NOTE — PROGRESS NOTES
[facility]  [address] [facility], [address] [place] [address]  QU:[phone] TM:[phone]  DISCHARGE SUMMARY  Patient Name: Teena Estrada : 1960   Treatment/Medical Diagnosis: Pain in right knee [M25.561]   Referral Source: HUONG Topete     Date of Initial Visit: 23 Attended Visits: 16 Missed Visits: 0     SUMMARY OF TREATMENT   PT intervention has consisted of therapeutic exercises, functional activities, manual therapy, neuromuscular re-education,HEP to improve ROM, mobility,flexibility, and strength to improve pt's ability to squat,bend, and lift increasing pt's ability to perform ADL's and occupational demands. CP utilized for pain management. CURRENT STATUS  Ms. Cm Mooney has shown good progress with Pt. Pt reports 75% improvement since initial eval.    FOTO has improved from 56 to 64 since last assessed. Pt has met 2/5 LTG and partially met goal #2 and #4. Progress Towards Goals   1. Independent with HEP. PN : patient reports that she is doing her HEP  23  CURRENT reports compliance 23  met     2  Decrease  R knee pain to 0-1 /10  to assist with ADLS and walking   PN    2 10/26/23    3 23   CURRENT- Patient reports 1/10 pain today. walk  a lot  3 2023 partially met     3  Improve FOTO Functional Status Score by  12 points to 67  in order to show significant functional improvement from eval baseline  PN   56 23  CURRENT    64 23  not met     4. To improve R knee Flex/ Ext , R hip Flex to 4+/5  to 5/5 in order to assist with ADLS and walking   PN    knee flex R 4+/5, ext 4+,5/5; hip flex 4-,4 /5;    23  goal in progress   CURRENT knee flex/ ext   4+     hip  flex  4 23 partially met      5 patient will tolerate TM  . 25 miles  with no increase pain for carryover to community activities  PN NA for distance  CURRENT . 31 distance 23  met  RECOMMENDATIONS  Discontinue therapy.

## 2023-12-01 NOTE — PROGRESS NOTES
Physical Therapy Discharge Instructions      [facility]  [address] [facility], [address] [facility], [address] [place] [address]  [phone] fax    Patient: Xena Pedraza  : 1960      Continue Home Exercise Program 2 times per day for 8 weeks, then decrease to  times per week      Continue with    [x] Ice  as needed 2 times per day     [] Heat           Follow up with MD:     [x] Upon completion of therapy     [] As needed      Recommendations:     []   Return to activity with home program    []   Return to activity with the following modifications:       []Post Rehab Program    []Join Independent aquatic program     []Return to/join local gym      Additional Comments:  Thanks for all your hard work      Pat Treadwell 2023 10:17 AM

## 2024-03-20 ENCOUNTER — TELEPHONE (OUTPATIENT)
Facility: CLINIC | Age: 64
End: 2024-03-20

## 2024-03-20 NOTE — TELEPHONE ENCOUNTER
Please inform patient that labs have been ordered for her prior to the appointment.  She needs to be scheduled for lab appointment

## 2024-03-20 NOTE — TELEPHONE ENCOUNTER
TC-call was made. Spoke to pt. Pt verbalizes with understanding. Pt was transfer to front to schedule lab appointment.

## 2024-03-20 NOTE — TELEPHONE ENCOUNTER
PT HAS AN APPOINTMENT FOR 4/08 SHE IS ASKING IF LABS ARE NEEDED PRIOR TO VISIT?  SHE SAYS IF NO LABS ARE NEEDED, THEN  SHE DOESN'T THINK SHE NEEDS TO KEEP THE APPOINTMENT AT THIS TIME.    PT# [phone] LET THE PATIENT KNOW.

## 2024-03-25 ENCOUNTER — HOSPITAL ENCOUNTER (OUTPATIENT)
Facility: HOSPITAL | Age: 64
Setting detail: SPECIMEN
Discharge: HOME | End: 2024-03-28
Payer: COMMERCIAL

## 2024-03-25 DIAGNOSIS — E78.00 PURE HYPERCHOLESTEROLEMIA, UNSPECIFIED: ICD-10-CM

## 2024-03-25 DIAGNOSIS — D70.9 NEUTROPENIA, UNSPECIFIED TYPE (HCC): ICD-10-CM

## 2024-03-25 LAB
ALBUMIN SERPL-MCNC: 3.5 G/DL (ref 3.4–5)
ALBUMIN/GLOB SERPL: 1.2 (ref 0.8–1.7)
ALP SERPL-CCNC: 78 U/L (ref 45–117)
ALT SERPL-CCNC: 21 U/L (ref 13–56)
ANION GAP SERPL CALC-SCNC: 4 MMOL/L (ref 3–18)
AST SERPL-CCNC: 20 U/L (ref 10–38)
BASOPHILS # BLD: 0 K/UL (ref 0–0.1)
BASOPHILS NFR BLD: 1 % (ref 0–2)
BILIRUB SERPL-MCNC: 0.5 MG/DL (ref 0.2–1)
BUN SERPL-MCNC: 12 MG/DL (ref 7–18)
BUN/CREAT SERPL: 16 (ref 12–20)
CALCIUM SERPL-MCNC: 9.1 MG/DL (ref 8.5–10.1)
CHLORIDE SERPL-SCNC: 112 MMOL/L (ref 100–111)
CHOLEST SERPL-MCNC: 216 MG/DL
CO2 SERPL-SCNC: 29 MMOL/L (ref 21–32)
CREAT SERPL-MCNC: 0.76 MG/DL (ref 0.6–1.3)
DIFFERENTIAL METHOD BLD: ABNORMAL
EOSINOPHIL # BLD: 0.3 K/UL (ref 0–0.4)
EOSINOPHIL NFR BLD: 10 % (ref 0–5)
ERYTHROCYTE [DISTWIDTH] IN BLOOD BY AUTOMATED COUNT: 13.7 % (ref 11.6–14.5)
GLOBULIN SER CALC-MCNC: 2.9 G/DL (ref 2–4)
GLUCOSE SERPL-MCNC: 80 MG/DL (ref 74–99)
HCT VFR BLD AUTO: 38 % (ref 35–45)
HDLC SERPL-MCNC: 88 MG/DL (ref 40–60)
HDLC SERPL: 2.5 (ref 0–5)
HGB BLD-MCNC: 12.2 G/DL (ref 12–16)
IMM GRANULOCYTES # BLD AUTO: 0 K/UL (ref 0–0.04)
IMM GRANULOCYTES NFR BLD AUTO: 0 % (ref 0–0.5)
LDLC SERPL CALC-MCNC: 116.8 MG/DL (ref 0–100)
LIPID PANEL: ABNORMAL
LYMPHOCYTES # BLD: 1.1 K/UL (ref 0.9–3.6)
LYMPHOCYTES NFR BLD: 41 % (ref 21–52)
MCH RBC QN AUTO: 28.3 PG (ref 24–34)
MCHC RBC AUTO-ENTMCNC: 32.1 G/DL (ref 31–37)
MCV RBC AUTO: 88.2 FL (ref 78–100)
MONOCYTES # BLD: 0.3 K/UL (ref 0.05–1.2)
MONOCYTES NFR BLD: 11 % (ref 3–10)
NEUTS SEG # BLD: 1.1 K/UL (ref 1.8–8)
NEUTS SEG NFR BLD: 38 % (ref 40–73)
NRBC # BLD: 0 K/UL (ref 0–0.01)
NRBC BLD-RTO: 0 PER 100 WBC
PLATELET # BLD AUTO: 172 K/UL (ref 135–420)
PMV BLD AUTO: 10.3 FL (ref 9.2–11.8)
POTASSIUM SERPL-SCNC: 4.4 MMOL/L (ref 3.5–5.5)
PROT SERPL-MCNC: 6.4 G/DL (ref 6.4–8.2)
RBC # BLD AUTO: 4.31 M/UL (ref 4.2–5.3)
SODIUM SERPL-SCNC: 145 MMOL/L (ref 136–145)
TRIGL SERPL-MCNC: 56 MG/DL
VLDLC SERPL CALC-MCNC: 11.2 MG/DL
WBC # BLD AUTO: 2.8 K/UL (ref 4.6–13.2)

## 2024-03-25 PROCEDURE — 36415 COLL VENOUS BLD VENIPUNCTURE: CPT

## 2024-03-25 PROCEDURE — 80053 COMPREHEN METABOLIC PANEL: CPT

## 2024-03-25 PROCEDURE — 85025 COMPLETE CBC W/AUTO DIFF WBC: CPT

## 2024-03-25 PROCEDURE — 80061 LIPID PANEL: CPT

## 2024-04-08 ENCOUNTER — OFFICE VISIT (OUTPATIENT)
Facility: CLINIC | Age: 64
End: 2024-04-08
Payer: COMMERCIAL

## 2024-04-08 VITALS
WEIGHT: 177 LBS | RESPIRATION RATE: 16 BRPM | HEART RATE: 83 BPM | SYSTOLIC BLOOD PRESSURE: 160 MMHG | BODY MASS INDEX: 27.78 KG/M2 | DIASTOLIC BLOOD PRESSURE: 94 MMHG | OXYGEN SATURATION: 96 % | HEIGHT: 67 IN | TEMPERATURE: 98.6 F

## 2024-04-08 DIAGNOSIS — M15.9 PRIMARY OSTEOARTHRITIS INVOLVING MULTIPLE JOINTS: ICD-10-CM

## 2024-04-08 DIAGNOSIS — E78.00 PURE HYPERCHOLESTEROLEMIA, UNSPECIFIED: ICD-10-CM

## 2024-04-08 DIAGNOSIS — D70.9 NEUTROPENIA, UNSPECIFIED TYPE (HCC): ICD-10-CM

## 2024-04-08 DIAGNOSIS — I10 ESSENTIAL (PRIMARY) HYPERTENSION: Primary | ICD-10-CM

## 2024-04-08 DIAGNOSIS — E55.9 VITAMIN D DEFICIENCY, UNSPECIFIED: ICD-10-CM

## 2024-04-08 PROCEDURE — 3077F SYST BP >= 140 MM HG: CPT | Performed by: STUDENT IN AN ORGANIZED HEALTH CARE EDUCATION/TRAINING PROGRAM

## 2024-04-08 PROCEDURE — 3079F DIAST BP 80-89 MM HG: CPT | Performed by: STUDENT IN AN ORGANIZED HEALTH CARE EDUCATION/TRAINING PROGRAM

## 2024-04-08 PROCEDURE — 99214 OFFICE O/P EST MOD 30 MIN: CPT | Performed by: STUDENT IN AN ORGANIZED HEALTH CARE EDUCATION/TRAINING PROGRAM

## 2024-04-08 RX ORDER — PRAVASTATIN SODIUM 10 MG
10 TABLET ORAL DAILY
Qty: 90 TABLET | Refills: 1 | Status: SHIPPED | OUTPATIENT
Start: 2024-04-08

## 2024-04-08 RX ORDER — LOSARTAN POTASSIUM 50 MG/1
50 TABLET ORAL DAILY
Qty: 90 TABLET | Refills: 0 | Status: SHIPPED | OUTPATIENT
Start: 2024-04-08

## 2024-04-08 SDOH — ECONOMIC STABILITY: FOOD INSECURITY: WITHIN THE PAST 12 MONTHS, YOU WORRIED THAT YOUR FOOD WOULD RUN OUT BEFORE YOU GOT MONEY TO BUY MORE.: NEVER TRUE

## 2024-04-08 SDOH — ECONOMIC STABILITY: FOOD INSECURITY: WITHIN THE PAST 12 MONTHS, THE FOOD YOU BOUGHT JUST DIDN'T LAST AND YOU DIDN'T HAVE MONEY TO GET MORE.: NEVER TRUE

## 2024-04-08 SDOH — ECONOMIC STABILITY: INCOME INSECURITY: HOW HARD IS IT FOR YOU TO PAY FOR THE VERY BASICS LIKE FOOD, HOUSING, MEDICAL CARE, AND HEATING?: NOT HARD AT ALL

## 2024-04-08 ASSESSMENT — ENCOUNTER SYMPTOMS
SHORTNESS OF BREATH: 0
RHINORRHEA: 0
WHEEZING: 0
ABDOMINAL PAIN: 0
BLOOD IN STOOL: 0
VOMITING: 0
CHEST TIGHTNESS: 0
NAUSEA: 0
COUGH: 0
BACK PAIN: 0
DIARRHEA: 0

## 2024-04-08 ASSESSMENT — PATIENT HEALTH QUESTIONNAIRE - PHQ9
SUM OF ALL RESPONSES TO PHQ9 QUESTIONS 1 & 2: 0
2. FEELING DOWN, DEPRESSED OR HOPELESS: NOT AT ALL
1. LITTLE INTEREST OR PLEASURE IN DOING THINGS: NOT AT ALL
SUM OF ALL RESPONSES TO PHQ QUESTIONS 1-9: 0

## 2024-04-08 ASSESSMENT — ANXIETY QUESTIONNAIRES
4. TROUBLE RELAXING: NOT AT ALL
1. FEELING NERVOUS, ANXIOUS, OR ON EDGE: NOT AT ALL
7. FEELING AFRAID AS IF SOMETHING AWFUL MIGHT HAPPEN: NOT AT ALL
2. NOT BEING ABLE TO STOP OR CONTROL WORRYING: NOT AT ALL
IF YOU CHECKED OFF ANY PROBLEMS ON THIS QUESTIONNAIRE, HOW DIFFICULT HAVE THESE PROBLEMS MADE IT FOR YOU TO DO YOUR WORK, TAKE CARE OF THINGS AT HOME, OR GET ALONG WITH OTHER PEOPLE: NOT DIFFICULT AT ALL
3. WORRYING TOO MUCH ABOUT DIFFERENT THINGS: NOT AT ALL
GAD7 TOTAL SCORE: 0
5. BEING SO RESTLESS THAT IT IS HARD TO SIT STILL: NOT AT ALL

## 2024-04-08 NOTE — PROGRESS NOTES
Lynne Julien is a 63 y.o. female presenting today for 6 Month Follow-Up  .     Chief Complaint   Patient presents with    6 Month Follow-Up       HPI:  Lynne Julien presents to the office today for follow up.    Patient has a past medical history of HTN, HLD, OA.     HLD: Patient has a history of HLD with extensive family history of DM, HLD. She tried crestor, lipitor, and another statin but developed severe myalgias. She was switched to Zetia - with no side effects. She stopped taking it 2 yrs ago coz she felt like it wasn't doing anything. She has been exercising to stay healthy. 4 yrs ago she had intermittent chest pain while exercising - she underwent a stress test which was negative.  Lipid panel in 2/22 showed , HDL 92, triglycerides 69.  She was resumed on pravastatin.  Patient has been tolerating it well with no myalgias.  Repeat lipid panel in 3/2024 showed .8, HDL 88, triglycerides 56 with total cholesterol 216.     Patient states she has history of elevated LFTs. Screening for hepatitis was negative at that time. She had a liver biopsy which was negative 25 yrs ago. No cause was identified. Recent LFTs are normal.     Pt underwent a hysterectomy in 2017 after atypical cells were noted on the cervix.      Neutropenia: Patient was following with hematology.  Last CBC showed WBC 2.8 with absolute neutrophils 1.1.  SPEP reported within normal limits-with no suspicious bands.  Peripheral blood cytometry showed no evidence of myeloid neoplasm or lymphoproliferative disorder.  Etiology was unclear.  Per hematology-since his studies were negative neutrophil count remains greater than thousand without other cytopenias-continue observation unless counts worsen or she develops recurrent infections.     Chronic back pain: She used to go to a chiropracter in the past and was told she had OA. She has been taking ibuprofen intermittently which relieves the pain.  Patient was referred to PT with

## 2024-05-15 ENCOUNTER — NURSE ONLY (OUTPATIENT)
Facility: CLINIC | Age: 64
End: 2024-05-15

## 2024-05-15 VITALS — SYSTOLIC BLOOD PRESSURE: 141 MMHG | DIASTOLIC BLOOD PRESSURE: 78 MMHG

## 2024-05-15 DIAGNOSIS — I10 ESSENTIAL (PRIMARY) HYPERTENSION: Primary | ICD-10-CM

## 2024-05-15 RX ORDER — HYDROCHLOROTHIAZIDE 12.5 MG/1
12.5 CAPSULE, GELATIN COATED ORAL EVERY MORNING
Qty: 90 CAPSULE | Refills: 0 | Status: SHIPPED | OUTPATIENT
Start: 2024-05-15

## 2024-05-15 NOTE — PROGRESS NOTES
Patient presents today for BP check.  BP was still elevated.  She has not been taking the losartan-would prefer another medication.  Prescribed HCTZ instead

## 2024-07-08 DIAGNOSIS — I10 ESSENTIAL (PRIMARY) HYPERTENSION: ICD-10-CM

## 2024-07-08 RX ORDER — LOSARTAN POTASSIUM 50 MG/1
50 TABLET ORAL DAILY
Qty: 90 TABLET | Refills: 0 | Status: SHIPPED | OUTPATIENT
Start: 2024-07-08

## 2024-08-06 ENCOUNTER — OFFICE VISIT (OUTPATIENT)
Facility: CLINIC | Age: 64
End: 2024-08-06
Payer: COMMERCIAL

## 2024-08-06 VITALS
SYSTOLIC BLOOD PRESSURE: 141 MMHG | OXYGEN SATURATION: 94 % | TEMPERATURE: 97.5 F | HEART RATE: 106 BPM | HEIGHT: 67 IN | RESPIRATION RATE: 16 BRPM | DIASTOLIC BLOOD PRESSURE: 88 MMHG | WEIGHT: 174 LBS | BODY MASS INDEX: 27.31 KG/M2

## 2024-08-06 DIAGNOSIS — U07.1 COVID-19: ICD-10-CM

## 2024-08-06 DIAGNOSIS — J06.9 VIRAL URI: Primary | ICD-10-CM

## 2024-08-06 DIAGNOSIS — R05.1 ACUTE COUGH: ICD-10-CM

## 2024-08-06 DIAGNOSIS — R09.81 CONGESTION OF NASAL SINUS: ICD-10-CM

## 2024-08-06 PROCEDURE — 3077F SYST BP >= 140 MM HG: CPT | Performed by: STUDENT IN AN ORGANIZED HEALTH CARE EDUCATION/TRAINING PROGRAM

## 2024-08-06 PROCEDURE — 3079F DIAST BP 80-89 MM HG: CPT | Performed by: STUDENT IN AN ORGANIZED HEALTH CARE EDUCATION/TRAINING PROGRAM

## 2024-08-06 PROCEDURE — 99214 OFFICE O/P EST MOD 30 MIN: CPT | Performed by: STUDENT IN AN ORGANIZED HEALTH CARE EDUCATION/TRAINING PROGRAM

## 2024-08-06 PROCEDURE — 87635 SARS-COV-2 COVID-19 AMP PRB: CPT | Performed by: STUDENT IN AN ORGANIZED HEALTH CARE EDUCATION/TRAINING PROGRAM

## 2024-08-06 RX ORDER — BENZONATATE 100 MG/1
100 CAPSULE ORAL 3 TIMES DAILY PRN
Qty: 30 CAPSULE | Refills: 0 | Status: SHIPPED | OUTPATIENT
Start: 2024-08-06 | End: 2024-08-16

## 2024-08-06 SDOH — ECONOMIC STABILITY: FOOD INSECURITY: WITHIN THE PAST 12 MONTHS, THE FOOD YOU BOUGHT JUST DIDN'T LAST AND YOU DIDN'T HAVE MONEY TO GET MORE.: NEVER TRUE

## 2024-08-06 SDOH — ECONOMIC STABILITY: FOOD INSECURITY: WITHIN THE PAST 12 MONTHS, YOU WORRIED THAT YOUR FOOD WOULD RUN OUT BEFORE YOU GOT MONEY TO BUY MORE.: NEVER TRUE

## 2024-08-06 SDOH — ECONOMIC STABILITY: INCOME INSECURITY: HOW HARD IS IT FOR YOU TO PAY FOR THE VERY BASICS LIKE FOOD, HOUSING, MEDICAL CARE, AND HEATING?: NOT HARD AT ALL

## 2024-08-06 ASSESSMENT — ENCOUNTER SYMPTOMS
SHORTNESS OF BREATH: 0
WHEEZING: 0
ABDOMINAL PAIN: 0
VOMITING: 0
RHINORRHEA: 1
CHEST TIGHTNESS: 0
NAUSEA: 0
BLOOD IN STOOL: 0
COUGH: 1
DIARRHEA: 0
SINUS PRESSURE: 1
BACK PAIN: 0

## 2024-08-06 ASSESSMENT — PATIENT HEALTH QUESTIONNAIRE - PHQ9
SUM OF ALL RESPONSES TO PHQ9 QUESTIONS 1 & 2: 0
SUM OF ALL RESPONSES TO PHQ QUESTIONS 1-9: 0
1. LITTLE INTEREST OR PLEASURE IN DOING THINGS: NOT AT ALL
SUM OF ALL RESPONSES TO PHQ QUESTIONS 1-9: 0
2. FEELING DOWN, DEPRESSED OR HOPELESS: NOT AT ALL

## 2024-08-06 ASSESSMENT — ANXIETY QUESTIONNAIRES
4. TROUBLE RELAXING: NOT AT ALL
1. FEELING NERVOUS, ANXIOUS, OR ON EDGE: NOT AT ALL
5. BEING SO RESTLESS THAT IT IS HARD TO SIT STILL: NOT AT ALL
IF YOU CHECKED OFF ANY PROBLEMS ON THIS QUESTIONNAIRE, HOW DIFFICULT HAVE THESE PROBLEMS MADE IT FOR YOU TO DO YOUR WORK, TAKE CARE OF THINGS AT HOME, OR GET ALONG WITH OTHER PEOPLE: NOT DIFFICULT AT ALL
2. NOT BEING ABLE TO STOP OR CONTROL WORRYING: NOT AT ALL
7. FEELING AFRAID AS IF SOMETHING AWFUL MIGHT HAPPEN: NOT AT ALL
GAD7 TOTAL SCORE: 0
6. BECOMING EASILY ANNOYED OR IRRITABLE: NOT AT ALL
3. WORRYING TOO MUCH ABOUT DIFFERENT THINGS: NOT AT ALL

## 2024-08-06 NOTE — PROGRESS NOTES
\"Have you been to the ER, urgent care clinic since your last visit?  Hospitalized since your last visit?\"    NO    “Have you seen or consulted any other health care providers outside of [facility] since your last visit?”    NO            Click Here for Release of Records Request

## 2024-08-06 NOTE — PROGRESS NOTES
Lynne Julien is a 63 y.o. female presenting today for Congestion (Headaches and body ache)  .     Chief Complaint   Patient presents with    Congestion     Headaches and body ache       HPI:  Lynne Julien presents to the office today for flu-like symptoms.    Patient has a past medical history of HTN, HLD, OA.    Patient presents today complaining of a headache, cough, runny nose, congestion, fatigue, chills.  Started yesterday.  She had gone to a gathering recently.     HLD: Patient has a history of HLD with extensive family history of DM, HLD. She tried crestor, lipitor, and another statin but developed severe myalgias. She was switched to Zetia - with no side effects. She stopped taking it 2 yrs ago coz she felt like it wasn't doing anything. She has been exercising to stay healthy. 4 yrs ago she had intermittent chest pain while exercising - she underwent a stress test which was negative.  Lipid panel in 2/22 showed , HDL 92, triglycerides 69.  She was resumed on pravastatin.  Patient has been tolerating it well with no myalgias.  Repeat lipid panel in 3/2024 showed .8, HDL 88, triglycerides 56 with total cholesterol 216.     Patient states she has history of elevated LFTs. Screening for hepatitis was negative at that time. She had a liver biopsy which was negative 25 yrs ago. No cause was identified. Recent LFTs are normal.     Pt underwent a hysterectomy in 2017 after atypical cells were noted on the cervix.      Neutropenia: Patient was following with hematology.  Last CBC showed WBC 2.8 with absolute neutrophils 1.1.  SPEP reported within normal limits-with no suspicious bands.  Peripheral blood cytometry showed no evidence of myeloid neoplasm or lymphoproliferative disorder.  Etiology was unclear.  Per hematology-since  studies were negative, neutrophil count remains greater than thousand without other cytopenias-continue observation unless counts worsen or she develops recurrent

## 2024-08-07 LAB
EXP DATE SOLUTION: NORMAL
EXP DATE SWAB: NORMAL
EXPIRATION DATE: NORMAL
LOT NUMBER POC: NORMAL
LOT NUMBER SOLUTION: NORMAL
LOT NUMBER SWAB: NORMAL
SARS-COV-2 RNA, POC: POSITIVE

## 2024-08-14 DIAGNOSIS — I10 ESSENTIAL (PRIMARY) HYPERTENSION: ICD-10-CM

## 2024-08-14 RX ORDER — HYDROCHLOROTHIAZIDE 12.5 MG/1
12.5 CAPSULE, GELATIN COATED ORAL EVERY MORNING
Qty: 90 CAPSULE | Refills: 0 | Status: SHIPPED | OUTPATIENT
Start: 2024-08-14

## 2024-09-23 ENCOUNTER — TELEPHONE (OUTPATIENT)
Facility: CLINIC | Age: 64
End: 2024-09-23

## 2024-09-23 DIAGNOSIS — L23.7 POISON IVY DERMATITIS: Primary | ICD-10-CM

## 2024-09-24 RX ORDER — CLOBETASOL PROPIONATE 0.5 MG/G
CREAM TOPICAL
Qty: 60 G | Refills: 1 | Status: SHIPPED | OUTPATIENT
Start: 2024-09-24

## 2024-09-25 DIAGNOSIS — I10 ESSENTIAL (PRIMARY) HYPERTENSION: ICD-10-CM

## 2024-09-25 RX ORDER — LOSARTAN POTASSIUM 50 MG/1
50 TABLET ORAL DAILY
Qty: 90 TABLET | Refills: 0 | Status: SHIPPED | OUTPATIENT
Start: 2024-09-25

## 2024-10-08 ENCOUNTER — OFFICE VISIT (OUTPATIENT)
Facility: CLINIC | Age: 64
End: 2024-10-08
Payer: COMMERCIAL

## 2024-10-08 VITALS
WEIGHT: 176 LBS | BODY MASS INDEX: 27.62 KG/M2 | HEART RATE: 82 BPM | OXYGEN SATURATION: 96 % | SYSTOLIC BLOOD PRESSURE: 134 MMHG | DIASTOLIC BLOOD PRESSURE: 76 MMHG | HEIGHT: 67 IN

## 2024-10-08 DIAGNOSIS — E78.00 PURE HYPERCHOLESTEROLEMIA, UNSPECIFIED: ICD-10-CM

## 2024-10-08 DIAGNOSIS — D70.9 NEUTROPENIA, UNSPECIFIED TYPE (HCC): ICD-10-CM

## 2024-10-08 DIAGNOSIS — Z12.31 BREAST CANCER SCREENING BY MAMMOGRAM: ICD-10-CM

## 2024-10-08 DIAGNOSIS — I10 ESSENTIAL (PRIMARY) HYPERTENSION: Primary | ICD-10-CM

## 2024-10-08 DIAGNOSIS — Z23 ENCOUNTER FOR IMMUNIZATION: ICD-10-CM

## 2024-10-08 PROCEDURE — 3078F DIAST BP <80 MM HG: CPT | Performed by: STUDENT IN AN ORGANIZED HEALTH CARE EDUCATION/TRAINING PROGRAM

## 2024-10-08 PROCEDURE — 3075F SYST BP GE 130 - 139MM HG: CPT | Performed by: STUDENT IN AN ORGANIZED HEALTH CARE EDUCATION/TRAINING PROGRAM

## 2024-10-08 PROCEDURE — 90661 CCIIV3 VAC ABX FR 0.5 ML IM: CPT | Performed by: STUDENT IN AN ORGANIZED HEALTH CARE EDUCATION/TRAINING PROGRAM

## 2024-10-08 PROCEDURE — 90471 IMMUNIZATION ADMIN: CPT | Performed by: STUDENT IN AN ORGANIZED HEALTH CARE EDUCATION/TRAINING PROGRAM

## 2024-10-08 PROCEDURE — 99214 OFFICE O/P EST MOD 30 MIN: CPT | Performed by: STUDENT IN AN ORGANIZED HEALTH CARE EDUCATION/TRAINING PROGRAM

## 2024-10-08 RX ORDER — PRAVASTATIN SODIUM 10 MG
10 TABLET ORAL DAILY
Qty: 90 TABLET | Refills: 1 | Status: SHIPPED | OUTPATIENT
Start: 2024-10-08

## 2024-10-08 RX ORDER — LOSARTAN POTASSIUM AND HYDROCHLOROTHIAZIDE 12.5; 5 MG/1; MG/1
1 TABLET ORAL DAILY
Qty: 90 TABLET | Refills: 1 | Status: SHIPPED | OUTPATIENT
Start: 2024-10-08

## 2024-10-08 SDOH — ECONOMIC STABILITY: FOOD INSECURITY: WITHIN THE PAST 12 MONTHS, YOU WORRIED THAT YOUR FOOD WOULD RUN OUT BEFORE YOU GOT MONEY TO BUY MORE.: NEVER TRUE

## 2024-10-08 SDOH — ECONOMIC STABILITY: INCOME INSECURITY: HOW HARD IS IT FOR YOU TO PAY FOR THE VERY BASICS LIKE FOOD, HOUSING, MEDICAL CARE, AND HEATING?: NOT HARD AT ALL

## 2024-10-08 SDOH — ECONOMIC STABILITY: FOOD INSECURITY: WITHIN THE PAST 12 MONTHS, THE FOOD YOU BOUGHT JUST DIDN'T LAST AND YOU DIDN'T HAVE MONEY TO GET MORE.: NEVER TRUE

## 2024-10-08 ASSESSMENT — ENCOUNTER SYMPTOMS
BLOOD IN STOOL: 0
BACK PAIN: 0
COUGH: 0
WHEEZING: 0
DIARRHEA: 0
NAUSEA: 0
CHEST TIGHTNESS: 0
SHORTNESS OF BREATH: 0
SINUS PRESSURE: 0
RHINORRHEA: 0
ABDOMINAL PAIN: 0
VOMITING: 0

## 2024-10-08 ASSESSMENT — PATIENT HEALTH QUESTIONNAIRE - PHQ9
1. LITTLE INTEREST OR PLEASURE IN DOING THINGS: NOT AT ALL
SUM OF ALL RESPONSES TO PHQ9 QUESTIONS 1 & 2: 0
SUM OF ALL RESPONSES TO PHQ QUESTIONS 1-9: 0
2. FEELING DOWN, DEPRESSED OR HOPELESS: NOT AT ALL

## 2024-10-08 NOTE — PROGRESS NOTES
Lynne Julien is a 63 y.o. year old female who presents today for No chief complaint on file.      Is someone accompanying this pt? no    Is the patient using any DME equipment during OV? no    Depression Screening:       10/8/2024     9:08 AM 8/6/2024     4:23 PM 4/8/2024     8:58 AM 10/11/2023    10:30 AM 5/11/2023    10:32 AM 4/18/2023     9:17 AM 12/20/2022    10:19 AM   PHQ-9 Questionaire   Little interest or pleasure in doing things 0 0 0 0 0 0 0   Feeling down, depressed, or hopeless 0 0 0 0 0 0 0   PHQ-9 Total Score 0 0 0 0 0 0 0       Abuse Screening:       No data to display                Learning Assessment:  No question data found.    Fall Risk:       No data to display                    Coordination of Care:   1. \"Have you been to the ER, urgent care clinic since your last visit?  Hospitalized since your last visit?\" no    2. \"Have you seen or consulted any other health care providers outside of the [facility] since your last visit?\" no    3. For patients aged 45-75: Has the patient had a colonoscopy / FIT/ Cologuard? yes    If the patient is female:    4. For patients aged 40-74: Has the patient had a mammogram within the past 2 years? yes    5. For patients aged 21-65: Has the patient had a pap smear? Yes     Health Maintenance: reviewed and discussed and ordered per Provider.    Health Maintenance Due   Topic Date Due    Shingles vaccine (1 of 2) Never done    Respiratory Syncytial Virus (RSV) Pregnant or age 60 yrs+ (1 - 1-dose 60+ series) Never done    Flu vaccine (1) 08/01/2024    COVID-19 Vaccine (5 - 2023-24 season) 09/01/2024        - SAMANTHA ESPINOZA   [facility]

## 2024-10-08 NOTE — PROGRESS NOTES
Lynne Julien is a 63 y.o. female presenting today for Follow-up (PT presents for 6 month f/u/)  .     Chief Complaint   Patient presents with    Follow-up     PT presents for 6 month f/u         HPI:  Lynne Julien presents to the office today for follow up.    Patient has a past medical history of HTN, HLD, OA.     HLD: Patient has a history of HLD with extensive family history of DM, HLD. She tried crestor, lipitor, and another statin but developed severe myalgias. She was switched to Zetia - with no side effects. She stopped taking it 2 yrs ago coz she felt like it wasn't doing anything. She has been exercising to stay healthy. 4 yrs ago she had intermittent chest pain while exercising - she underwent a stress test which was negative.  Lipid panel in 2/22 showed , HDL 92, triglycerides 69.  She was resumed on pravastatin.  Patient has been tolerating it well with no myalgias.  Repeat lipid panel in 3/2024 showed .8, HDL 88, triglycerides 56 with total cholesterol 216.     Patient states she has history of elevated LFTs. Screening for hepatitis was negative at that time. She had a liver biopsy which was negative 25 yrs ago. No cause was identified. Recent LFTs are normal.     Pt underwent a hysterectomy in 2017 after atypical cells were noted on the cervix.      Neutropenia: Patient was following with hematology.  Last CBC showed WBC 2.8 with absolute neutrophils 1.1.  SPEP reported within normal limits-with no suspicious bands.  Peripheral blood cytometry showed no evidence of myeloid neoplasm or lymphoproliferative disorder.  Etiology was unclear.  Per hematology-since  studies were negative, neutrophil count remains greater than thousand without other cytopenias-continue observation unless counts worsen or she develops recurrent infections.     Chronic back pain: She used to go to a chiropracter in the past and was told she had OA. She has been taking ibuprofen intermittently which relieves the

## 2024-10-28 ENCOUNTER — TELEPHONE (OUTPATIENT)
Facility: CLINIC | Age: 64
End: 2024-10-28

## 2024-10-28 NOTE — TELEPHONE ENCOUNTER
Pt request chest X-ray, strep test, blood test for Iron   Request please call once orders are placed.

## 2024-10-29 ENCOUNTER — TELEPHONE (OUTPATIENT)
Facility: CLINIC | Age: 64
End: 2024-10-29

## 2024-10-29 DIAGNOSIS — R53.83 OTHER FATIGUE: ICD-10-CM

## 2024-10-29 DIAGNOSIS — R09.89 CHEST CONGESTION: Primary | ICD-10-CM

## 2024-10-31 ENCOUNTER — HOSPITAL ENCOUNTER (OUTPATIENT)
Facility: HOSPITAL | Age: 64
Discharge: HOME | End: 2024-11-03
Payer: COMMERCIAL

## 2024-10-31 DIAGNOSIS — R09.89 CHEST CONGESTION: ICD-10-CM

## 2024-10-31 PROCEDURE — 71046 X-RAY EXAM CHEST 2 VIEWS: CPT

## 2024-12-20 ENCOUNTER — OFFICE VISIT (OUTPATIENT)
Facility: CLINIC | Age: 64
End: 2024-12-20
Payer: COMMERCIAL

## 2024-12-20 VITALS
OXYGEN SATURATION: 98 % | SYSTOLIC BLOOD PRESSURE: 135 MMHG | HEART RATE: 81 BPM | DIASTOLIC BLOOD PRESSURE: 78 MMHG | BODY MASS INDEX: 27.72 KG/M2 | WEIGHT: 177 LBS

## 2024-12-20 DIAGNOSIS — H53.9 VISUAL DISTURBANCE: ICD-10-CM

## 2024-12-20 DIAGNOSIS — Z00.00 ENCOUNTER FOR ANNUAL PHYSICAL EXAM: Primary | ICD-10-CM

## 2024-12-20 DIAGNOSIS — I10 ESSENTIAL (PRIMARY) HYPERTENSION: ICD-10-CM

## 2024-12-20 DIAGNOSIS — E78.00 PURE HYPERCHOLESTEROLEMIA, UNSPECIFIED: ICD-10-CM

## 2024-12-20 DIAGNOSIS — H92.02 LEFT EAR PAIN: ICD-10-CM

## 2024-12-20 PROCEDURE — 99396 PREV VISIT EST AGE 40-64: CPT | Performed by: STUDENT IN AN ORGANIZED HEALTH CARE EDUCATION/TRAINING PROGRAM

## 2024-12-20 PROCEDURE — 3075F SYST BP GE 130 - 139MM HG: CPT | Performed by: STUDENT IN AN ORGANIZED HEALTH CARE EDUCATION/TRAINING PROGRAM

## 2024-12-20 PROCEDURE — 3078F DIAST BP <80 MM HG: CPT | Performed by: STUDENT IN AN ORGANIZED HEALTH CARE EDUCATION/TRAINING PROGRAM

## 2024-12-20 RX ORDER — PRAVASTATIN SODIUM 10 MG
10 TABLET ORAL DAILY
Qty: 90 TABLET | Refills: 1 | Status: SHIPPED | OUTPATIENT
Start: 2024-12-20

## 2024-12-20 RX ORDER — LOSARTAN POTASSIUM AND HYDROCHLOROTHIAZIDE 12.5; 5 MG/1; MG/1
1 TABLET ORAL DAILY
Qty: 90 TABLET | Refills: 1 | Status: SHIPPED | OUTPATIENT
Start: 2024-12-20

## 2024-12-20 SDOH — ECONOMIC STABILITY: FOOD INSECURITY: WITHIN THE PAST 12 MONTHS, YOU WORRIED THAT YOUR FOOD WOULD RUN OUT BEFORE YOU GOT MONEY TO BUY MORE.: NEVER TRUE

## 2024-12-20 SDOH — ECONOMIC STABILITY: INCOME INSECURITY: HOW HARD IS IT FOR YOU TO PAY FOR THE VERY BASICS LIKE FOOD, HOUSING, MEDICAL CARE, AND HEATING?: NOT HARD AT ALL

## 2024-12-20 SDOH — ECONOMIC STABILITY: FOOD INSECURITY: WITHIN THE PAST 12 MONTHS, THE FOOD YOU BOUGHT JUST DIDN'T LAST AND YOU DIDN'T HAVE MONEY TO GET MORE.: NEVER TRUE

## 2024-12-20 ASSESSMENT — ENCOUNTER SYMPTOMS
WHEEZING: 0
SHORTNESS OF BREATH: 0
RHINORRHEA: 0
BACK PAIN: 0
NAUSEA: 0
DIARRHEA: 0
SINUS PRESSURE: 0
VOMITING: 0
BLOOD IN STOOL: 0
CHEST TIGHTNESS: 0
COUGH: 0
ABDOMINAL PAIN: 0

## 2024-12-20 ASSESSMENT — PATIENT HEALTH QUESTIONNAIRE - PHQ9
2. FEELING DOWN, DEPRESSED OR HOPELESS: NOT AT ALL
1. LITTLE INTEREST OR PLEASURE IN DOING THINGS: NOT AT ALL
SUM OF ALL RESPONSES TO PHQ QUESTIONS 1-9: 0
SUM OF ALL RESPONSES TO PHQ9 QUESTIONS 1 & 2: 0

## 2024-12-20 NOTE — PROGRESS NOTES
Lynne Julien is a 64 y.o. female presenting today for Annual Exam (PT presents for annual exam.   PT is concerned about eye site. PT requesting neuro referral.  )  .     Chief Complaint   Patient presents with    Annual Exam     PT presents for annual exam.   PT is concerned about eye site. PT requesting neuro referral.         HPI:  Lynne Julien presents to the office today for annual physical    Patient has a past medical history of HTN, HLD, OA.     HLD: Patient has a history of HLD with extensive family history of DM, HLD. She tried crestor, lipitor, and another statin but developed severe myalgias. She was switched to Zetia - with no side effects. She stopped taking it 2 yrs ago coz she felt like it wasn't doing anything. She has been exercising to stay healthy. 4 yrs ago she had intermittent chest pain while exercising - she underwent a stress test which was negative.  Lipid panel in 2/22 showed , HDL 92, triglycerides 69.  She was resumed on pravastatin.  Patient has been tolerating it well with no myalgias.  Repeat lipid panel in 3/2024 showed .8, HDL 88, triglycerides 56 with total cholesterol 216.  Dose of pravastatin was increased last visit.     Patient states she has history of elevated LFTs. Screening for hepatitis was negative at that time. She had a liver biopsy which was negative 25 yrs ago. No cause was identified. Recent LFTs are normal.     Pt underwent a hysterectomy in 2017 after atypical cells were noted on the cervix.      Neutropenia: Patient was following with hematology.  Last CBC showed WBC 2.8 with absolute neutrophils 1.1.  SPEP reported within normal limits-with no suspicious bands.  Peripheral blood cytometry showed no evidence of myeloid neoplasm or lymphoproliferative disorder.  Etiology was unclear.  Per hematology-since  studies were negative, neutrophil count remains greater than thousand without other cytopenias-continue observation unless counts worsen or she

## 2024-12-20 NOTE — PROGRESS NOTES
Lynne Julien is a 64 y.o. year old female who presents today for No chief complaint on file.      Is someone accompanying this pt? NO    Is the patient using any DME equipment during OV? NO    Depression Screening:       10/8/2024     9:08 AM 8/6/2024     4:23 PM 4/8/2024     8:58 AM 10/11/2023    10:30 AM 5/11/2023    10:32 AM 4/18/2023     9:17 AM 12/20/2022    10:19 AM   PHQ-9 Questionaire   Little interest or pleasure in doing things 0 0 0 0 0 0 0   Feeling down, depressed, or hopeless 0 0 0 0 0 0 0   PHQ-9 Total Score 0 0 0 0 0 0 0       Abuse Screening:       No data to display                Learning Assessment:  No question data found.    Fall Risk:       No data to display                    Coordination of Care:   1. \"Have you been to the ER, urgent care clinic since your last visit?  Hospitalized since your last visit?\" NO    2. \"Have you seen or consulted any other health care providers outside of the [facility] since your last visit?\" NO    3. For patients aged 45-75: Has the patient had a colonoscopy / FIT/ Cologuard? NO    If the patient is female:    4. For patients aged 40-74: Has the patient had a mammogram within the past 2 years? SCHEDULED     5. For patients aged 21-65: Has the patient had a pap smear? NO    Health Maintenance: reviewed and discussed and ordered per Provider.    Health Maintenance Due   Topic Date Due    Shingles vaccine (1 of 2) Never done    Respiratory Syncytial Virus (RSV) Pregnant or age 60 yrs+ (1 - Risk 60-74 years 1-dose series) Never done    COVID-19 Vaccine (5 - 2023-24 season) 09/01/2024    Breast cancer screen  12/26/2024        - SAMANTHA ESPINOZA   [facility]        Click Here for Release of Records Request

## 2024-12-27 ENCOUNTER — TELEPHONE (OUTPATIENT)
Facility: CLINIC | Age: 64
End: 2024-12-27

## 2024-12-27 ENCOUNTER — HOSPITAL ENCOUNTER (OUTPATIENT)
Facility: HOSPITAL | Age: 64
Discharge: HOME | End: 2024-12-30
Attending: STUDENT IN AN ORGANIZED HEALTH CARE EDUCATION/TRAINING PROGRAM
Payer: COMMERCIAL

## 2024-12-27 DIAGNOSIS — H53.9 VISUAL DISTURBANCE: ICD-10-CM

## 2024-12-27 PROCEDURE — 70450 CT HEAD/BRAIN W/O DYE: CPT

## 2025-03-28 ENCOUNTER — HOSPITAL ENCOUNTER (OUTPATIENT)
Facility: HOSPITAL | Age: 65
Setting detail: SPECIMEN
Discharge: HOME | End: 2025-03-31

## 2025-03-28 LAB
BASOPHILS # BLD: 1 % (ref 0–2)
BASOPHILS ABSOLUTE: 0 K/UL (ref 0–0.2)
EOSINOPHIL # BLD: 5 % (ref 0–6)
EOSINOPHILS ABSOLUTE: 0.1 K/UL (ref 0–0.5)
HCT VFR BLD CALC: 40 % (ref 35.1–48)
HEMOGLOBIN: 12.9 G/DL (ref 11.7–16)
LYMPHOCYTES # BLD: 35 % (ref 20–45)
LYMPHOCYTES ABSOLUTE: 0.8 K/UL (ref 1–4.8)
MCH RBC QN AUTO: 28 PG (ref 26–34)
MCHC RBC AUTO-ENTMCNC: 32 G/DL (ref 31–36)
MCV RBC AUTO: 88 FL (ref 80–99)
MONOCYTES ABSOLUTE: 0.3 K/UL (ref 0.1–1)
MONOCYTES: 14 % (ref 3–12)
NEUTROPHILS ABSOLUTE: 1 K/UL (ref 1.8–7.7)
NEUTROPHILS SEGMENTED: 45 % (ref 40–75)
PDW BLD-RTO: 13.4 % (ref 10–15.5)
PLATELET # BLD: 212 K/UL (ref 140–440)
PMV BLD AUTO: 10.7 FL (ref 9–13)
RBC # BLD: 4.54 M/UL (ref 3.8–5.2)
SENTARA SPECIMEN COLLECTION: NORMAL
WBC # BLD: 2.2 K/UL (ref 4–11)

## 2025-03-28 PROCEDURE — 99001 SPECIMEN HANDLING PT-LAB: CPT

## 2025-03-29 LAB
A/G RATIO: 1.7 RATIO (ref 1.1–2.6)
ALBUMIN: 4 G/DL (ref 3.5–5)
ALP BLD-CCNC: 59 U/L (ref 40–120)
ALT SERPL-CCNC: 17 U/L (ref 5–40)
ANION GAP SERPL CALCULATED.3IONS-SCNC: 12 MMOL/L (ref 3–15)
ANION GAP SERPL CALCULATED.3IONS-SCNC: 12 MMOL/L (ref 3–15)
AST SERPL-CCNC: 22 U/L (ref 10–37)
BILIRUB SERPL-MCNC: 0.4 MG/DL (ref 0.2–1.2)
BUN BLDV-MCNC: 13 MG/DL (ref 6–22)
BUN BLDV-MCNC: 13 MG/DL (ref 6–22)
CALCIUM SERPL-MCNC: 9.7 MG/DL (ref 8.4–10.5)
CALCIUM SERPL-MCNC: 9.7 MG/DL (ref 8.4–10.5)
CHLORIDE BLD-SCNC: 104 MMOL/L (ref 98–110)
CHLORIDE BLD-SCNC: 104 MMOL/L (ref 98–110)
CHOLESTEROL, TOTAL: 217 MG/DL (ref 110–200)
CHOLESTEROL/HDL RATIO: 3.1 (ref 0–5)
CO2: 27 MMOL/L (ref 20–32)
CO2: 27 MMOL/L (ref 20–32)
CREAT SERPL-MCNC: 0.7 MG/DL (ref 0.8–1.4)
CREAT SERPL-MCNC: 0.7 MG/DL (ref 0.8–1.4)
FERRITIN: 51 NG/ML (ref 10–291)
GFR, ESTIMATED: >60 ML/MIN/1.73 SQ.M.
GFR, ESTIMATED: >60 ML/MIN/1.73 SQ.M.
GLOBULIN: 2.3 G/DL (ref 2–4)
GLUCOSE: 74 MG/DL (ref 70–99)
GLUCOSE: 74 MG/DL (ref 70–99)
HDLC SERPL-MCNC: 70 MG/DL
IRON % SATURATION: 22 % (ref 20–50)
IRON: 70 MCG/DL (ref 30–160)
LDL CHOLESTEROL: 135 MG/DL (ref 50–99)
LDL/HDL RATIO: 1.9
NON-HDL CHOLESTEROL: 147 MG/DL
POTASSIUM SERPL-SCNC: 5.2 MMOL/L (ref 3.5–5.5)
POTASSIUM SERPL-SCNC: 5.2 MMOL/L (ref 3.5–5.5)
SODIUM BLD-SCNC: 143 MMOL/L (ref 133–145)
SODIUM BLD-SCNC: 143 MMOL/L (ref 133–145)
TOTAL IRON BINDING CAPACITY: 312 MCG/DL (ref 228–428)
TOTAL PROTEIN: 6.3 G/DL (ref 6.2–8.1)
TRIGL SERPL-MCNC: 59 MG/DL (ref 40–149)
UNSATURATED IRON BINDING CAPACITY: 242 MCG/DL (ref 110–370)
VITAMIN D 25-HYDROXY: 44.6 NG/ML (ref 32–100)
VLDLC SERPL CALC-MCNC: 12 MG/DL (ref 8–30)

## 2025-05-02 ENCOUNTER — OFFICE VISIT (OUTPATIENT)
Facility: CLINIC | Age: 65
End: 2025-05-02
Payer: COMMERCIAL

## 2025-05-02 VITALS
TEMPERATURE: 98 F | WEIGHT: 154 LBS | RESPIRATION RATE: 18 BRPM | SYSTOLIC BLOOD PRESSURE: 128 MMHG | HEART RATE: 83 BPM | BODY MASS INDEX: 24.17 KG/M2 | OXYGEN SATURATION: 98 % | HEIGHT: 67 IN | DIASTOLIC BLOOD PRESSURE: 76 MMHG

## 2025-05-02 DIAGNOSIS — Z78.0 ASYMPTOMATIC POSTMENOPAUSAL STATE: ICD-10-CM

## 2025-05-02 DIAGNOSIS — E55.9 VITAMIN D DEFICIENCY, UNSPECIFIED: ICD-10-CM

## 2025-05-02 DIAGNOSIS — E78.00 PURE HYPERCHOLESTEROLEMIA, UNSPECIFIED: Primary | ICD-10-CM

## 2025-05-02 DIAGNOSIS — I10 ESSENTIAL (PRIMARY) HYPERTENSION: ICD-10-CM

## 2025-05-02 DIAGNOSIS — D70.9 NEUTROPENIA, UNSPECIFIED TYPE: ICD-10-CM

## 2025-05-02 PROCEDURE — 3074F SYST BP LT 130 MM HG: CPT | Performed by: STUDENT IN AN ORGANIZED HEALTH CARE EDUCATION/TRAINING PROGRAM

## 2025-05-02 PROCEDURE — 99214 OFFICE O/P EST MOD 30 MIN: CPT | Performed by: STUDENT IN AN ORGANIZED HEALTH CARE EDUCATION/TRAINING PROGRAM

## 2025-05-02 PROCEDURE — 3078F DIAST BP <80 MM HG: CPT | Performed by: STUDENT IN AN ORGANIZED HEALTH CARE EDUCATION/TRAINING PROGRAM

## 2025-05-02 RX ORDER — CYCLOSPORINE 0.5 MG/ML
EMULSION OPHTHALMIC
Status: CANCELLED | OUTPATIENT
Start: 2025-05-02

## 2025-05-02 RX ORDER — PRAVASTATIN SODIUM 10 MG
10 TABLET ORAL DAILY
Qty: 90 TABLET | Refills: 1 | Status: SHIPPED | OUTPATIENT
Start: 2025-05-02

## 2025-05-02 RX ORDER — LOSARTAN POTASSIUM AND HYDROCHLOROTHIAZIDE 12.5; 5 MG/1; MG/1
1 TABLET ORAL DAILY
Qty: 90 TABLET | Refills: 1 | Status: SHIPPED | OUTPATIENT
Start: 2025-05-02

## 2025-05-02 SDOH — ECONOMIC STABILITY: FOOD INSECURITY: WITHIN THE PAST 12 MONTHS, YOU WORRIED THAT YOUR FOOD WOULD RUN OUT BEFORE YOU GOT MONEY TO BUY MORE.: NEVER TRUE

## 2025-05-02 SDOH — ECONOMIC STABILITY: FOOD INSECURITY: WITHIN THE PAST 12 MONTHS, THE FOOD YOU BOUGHT JUST DIDN'T LAST AND YOU DIDN'T HAVE MONEY TO GET MORE.: NEVER TRUE

## 2025-05-02 ASSESSMENT — ENCOUNTER SYMPTOMS
CHEST TIGHTNESS: 0
SHORTNESS OF BREATH: 0
SINUS PRESSURE: 0
BACK PAIN: 0
COUGH: 0
NAUSEA: 0
DIARRHEA: 0
VOMITING: 0
RHINORRHEA: 0
WHEEZING: 0
BLOOD IN STOOL: 0
ABDOMINAL PAIN: 0

## 2025-05-02 ASSESSMENT — ANXIETY QUESTIONNAIRES
1. FEELING NERVOUS, ANXIOUS, OR ON EDGE: NOT AT ALL
3. WORRYING TOO MUCH ABOUT DIFFERENT THINGS: NOT AT ALL
7. FEELING AFRAID AS IF SOMETHING AWFUL MIGHT HAPPEN: NOT AT ALL
5. BEING SO RESTLESS THAT IT IS HARD TO SIT STILL: NOT AT ALL
4. TROUBLE RELAXING: NOT AT ALL
GAD7 TOTAL SCORE: 0
IF YOU CHECKED OFF ANY PROBLEMS ON THIS QUESTIONNAIRE, HOW DIFFICULT HAVE THESE PROBLEMS MADE IT FOR YOU TO DO YOUR WORK, TAKE CARE OF THINGS AT HOME, OR GET ALONG WITH OTHER PEOPLE: NOT DIFFICULT AT ALL
2. NOT BEING ABLE TO STOP OR CONTROL WORRYING: NOT AT ALL
6. BECOMING EASILY ANNOYED OR IRRITABLE: NOT AT ALL

## 2025-05-02 ASSESSMENT — PATIENT HEALTH QUESTIONNAIRE - PHQ9
2. FEELING DOWN, DEPRESSED OR HOPELESS: NOT AT ALL
1. LITTLE INTEREST OR PLEASURE IN DOING THINGS: NOT AT ALL
SUM OF ALL RESPONSES TO PHQ QUESTIONS 1-9: 0

## 2025-05-02 NOTE — PROGRESS NOTES
Have you been to the ER, urgent care clinic since your last visit?  Hospitalized since your last visit?   NO    Have you seen or consulted any other health care providers outside our system since your last visit?   NO            
Total 03/28/2025 217 (H)  110 - 200 mg/dL Final   • Triglycerides 03/28/2025 59  40 - 149 mg/dL Final   • HDL 03/28/2025 70  >=40 mg/dL Final   • Chol/HDL Ratio 03/28/2025 3.1  0.0 - 5.0 Final   • Non-HDL Cholesterol 03/28/2025 147 (H)  <130 mg/dL Final   • LDL Cholesterol 03/28/2025 135 (H)  50 - 99 mg/dL Final   • VLDL Cholesterol Calculated 03/28/2025 12  8 - 30 mg/dL Final   • LDL/HDL Ratio 03/28/2025 1.9   Final    Comment:   Test includes cholesterol, HDL cholesterol, triglycerides and LDL.    Cholesterol Recommended NCEP guidelines in mg/dL:    Less than 200            Desirable  200 - 239                Borderline High  Greater than or  = 240   High      Please Note:  Total Chol/HDL Ratio                     Men     Women  1/2 Avg. Risk    3.4     3.3      Avg. Risk    5.0     4.4  2X  Avg. Risk    9.6     7.1  3X  Avg. Risk   23.4    11.0         • Potassium 03/28/2025 5.2  3.5 - 5.5 mmol/L Final   • Sodium 03/28/2025 143  133 - 145 mmol/L Final   • Chloride 03/28/2025 104  98 - 110 mmol/L Final   • Glucose 03/28/2025 74  70 - 99 mg/dL Final   • Calcium 03/28/2025 9.7  8.4 - 10.5 mg/dL Final   • Albumin 03/28/2025 4.0  3.5 - 5.0 g/dL Final   • ALT 03/28/2025 17  5 - 40 U/L Final   • AST 03/28/2025 22  10 - 37 U/L Final   • Total Bilirubin 03/28/2025 0.4  0.2 - 1.2 mg/dL Final   • Alkaline Phosphatase 03/28/2025 59  40 - 120 U/L Final   • BUN 03/28/2025 13  6 - 22 mg/dL Final   • CO2 03/28/2025 27  20 - 32 mmol/L Final   • Creatinine 03/28/2025 0.7 (L)  0.8 - 1.4 mg/dL Final   • Est, Glom Filt Rate 03/28/2025 >60.0  >60.0 mL/min/1.73 sq.m. Final    Comment: eGFR calculation based on the Chronic Kidney Disease Epidemiology   Collaboration (CKD-EPI) equation refit without adjustment for race.    This eGFR is validated for stable chronic renal failure patients. This   equation is unreliable in acute illness or patients with normal renal   function.       • Globulin 03/28/2025 2.3  2.0 - 4.0 g/dL Final   •

## 2025-06-12 ENCOUNTER — HOSPITAL ENCOUNTER (OUTPATIENT)
Facility: HOSPITAL | Age: 65
Discharge: HOME | End: 2025-06-15
Attending: STUDENT IN AN ORGANIZED HEALTH CARE EDUCATION/TRAINING PROGRAM
Payer: COMMERCIAL

## 2025-06-12 DIAGNOSIS — Z78.0 ASYMPTOMATIC POSTMENOPAUSAL STATE: ICD-10-CM

## 2025-06-12 PROCEDURE — 77080 DXA BONE DENSITY AXIAL: CPT
